# Patient Record
Sex: FEMALE | Race: WHITE | NOT HISPANIC OR LATINO | URBAN - METROPOLITAN AREA
[De-identification: names, ages, dates, MRNs, and addresses within clinical notes are randomized per-mention and may not be internally consistent; named-entity substitution may affect disease eponyms.]

---

## 2017-01-03 ENCOUNTER — COMMUNICATION - HEALTHEAST (OUTPATIENT)
Dept: PALLIATIVE MEDICINE | Facility: OTHER | Age: 57
End: 2017-01-03

## 2017-01-03 DIAGNOSIS — M51.379 DEGENERATION OF LUMBAR OR LUMBOSACRAL INTERVERTEBRAL DISC: ICD-10-CM

## 2017-01-03 DIAGNOSIS — M47.812 CERVICAL SPONDYLOSIS WITHOUT MYELOPATHY: ICD-10-CM

## 2017-01-13 ENCOUNTER — COMMUNICATION - HEALTHEAST (OUTPATIENT)
Dept: PALLIATIVE MEDICINE | Facility: OTHER | Age: 57
End: 2017-01-13

## 2017-01-13 DIAGNOSIS — M47.812 CERVICAL SPONDYLOSIS WITHOUT MYELOPATHY: ICD-10-CM

## 2017-01-13 DIAGNOSIS — M51.379 DEGENERATION OF LUMBAR OR LUMBOSACRAL INTERVERTEBRAL DISC: ICD-10-CM

## 2017-02-08 ENCOUNTER — HOSPITAL ENCOUNTER (OUTPATIENT)
Dept: PALLIATIVE MEDICINE | Facility: OTHER | Age: 57
Discharge: HOME | End: 2017-02-08
Attending: ANESTHESIOLOGY

## 2017-02-08 DIAGNOSIS — G89.4 CHRONIC PAIN SYNDROME: ICD-10-CM

## 2017-02-09 ENCOUNTER — COMMUNICATION - HEALTHEAST (OUTPATIENT)
Dept: PALLIATIVE MEDICINE | Facility: OTHER | Age: 57
End: 2017-02-09

## 2017-02-10 ENCOUNTER — HOSPITAL ENCOUNTER (OUTPATIENT)
Dept: PALLIATIVE MEDICINE | Facility: OTHER | Age: 57
Discharge: HOME | End: 2017-02-10

## 2017-02-10 DIAGNOSIS — M54.6 PAIN IN THORACIC SPINE: ICD-10-CM

## 2017-02-10 DIAGNOSIS — F11.20 OPIOID TYPE DEPENDENCE, CONTINUOUS (H): ICD-10-CM

## 2017-02-10 DIAGNOSIS — M47.812 CERVICAL SPONDYLOSIS WITHOUT MYELOPATHY: ICD-10-CM

## 2017-02-10 DIAGNOSIS — M51.379 DEGENERATION OF LUMBAR OR LUMBOSACRAL INTERVERTEBRAL DISC: ICD-10-CM

## 2017-02-10 DIAGNOSIS — G89.29 OTHER CHRONIC PAIN: ICD-10-CM

## 2017-02-10 DIAGNOSIS — N94.9 FEMALE GENITAL SYMPTOMS: ICD-10-CM

## 2017-02-10 DIAGNOSIS — G89.4 CHRONIC PAIN SYNDROME: ICD-10-CM

## 2017-02-10 DIAGNOSIS — M54.32 SCIATICA OF LEFT SIDE: ICD-10-CM

## 2017-02-10 ASSESSMENT — MIFFLIN-ST. JEOR: SCORE: 1108.39

## 2017-03-09 ENCOUNTER — COMMUNICATION - HEALTHEAST (OUTPATIENT)
Dept: PALLIATIVE MEDICINE | Facility: OTHER | Age: 57
End: 2017-03-09

## 2017-03-09 DIAGNOSIS — M47.812 CERVICAL SPONDYLOSIS WITHOUT MYELOPATHY: ICD-10-CM

## 2017-03-09 DIAGNOSIS — M51.379 DEGENERATION OF LUMBAR OR LUMBOSACRAL INTERVERTEBRAL DISC: ICD-10-CM

## 2017-04-07 ENCOUNTER — HOSPITAL ENCOUNTER (OUTPATIENT)
Dept: PALLIATIVE MEDICINE | Facility: OTHER | Age: 57
Discharge: HOME | End: 2017-04-07
Attending: ANESTHESIOLOGY

## 2017-04-07 DIAGNOSIS — M54.2 CERVICALGIA: ICD-10-CM

## 2017-04-07 DIAGNOSIS — M47.812 CERVICAL SPONDYLOSIS WITHOUT MYELOPATHY: ICD-10-CM

## 2017-04-07 DIAGNOSIS — G89.4 CHRONIC PAIN SYNDROME: ICD-10-CM

## 2017-04-07 DIAGNOSIS — M51.379 DEGENERATION OF LUMBAR OR LUMBOSACRAL INTERVERTEBRAL DISC: ICD-10-CM

## 2017-04-07 ASSESSMENT — MIFFLIN-ST. JEOR: SCORE: 1108.39

## 2017-05-01 ENCOUNTER — COMMUNICATION - HEALTHEAST (OUTPATIENT)
Dept: PALLIATIVE MEDICINE | Facility: OTHER | Age: 57
End: 2017-05-01

## 2017-05-01 DIAGNOSIS — M54.2 CERVICALGIA: ICD-10-CM

## 2017-05-01 DIAGNOSIS — M51.379 DEGENERATION OF LUMBAR OR LUMBOSACRAL INTERVERTEBRAL DISC: ICD-10-CM

## 2017-05-01 DIAGNOSIS — M47.812 CERVICAL SPONDYLOSIS WITHOUT MYELOPATHY: ICD-10-CM

## 2017-05-09 ENCOUNTER — COMMUNICATION - HEALTHEAST (OUTPATIENT)
Dept: PALLIATIVE MEDICINE | Facility: OTHER | Age: 57
End: 2017-05-09

## 2017-05-09 DIAGNOSIS — M51.379 DEGENERATION OF LUMBAR OR LUMBOSACRAL INTERVERTEBRAL DISC: ICD-10-CM

## 2017-05-09 DIAGNOSIS — M54.2 CERVICALGIA: ICD-10-CM

## 2017-05-09 DIAGNOSIS — M47.812 CERVICAL SPONDYLOSIS WITHOUT MYELOPATHY: ICD-10-CM

## 2017-05-11 ENCOUNTER — COMMUNICATION - HEALTHEAST (OUTPATIENT)
Dept: PALLIATIVE MEDICINE | Facility: CLINIC | Age: 57
End: 2017-05-11

## 2017-05-11 DIAGNOSIS — M54.2 CERVICALGIA: ICD-10-CM

## 2017-05-19 ENCOUNTER — HOSPITAL ENCOUNTER (OUTPATIENT)
Dept: PALLIATIVE MEDICINE | Facility: OTHER | Age: 57
Discharge: HOME | End: 2017-05-19
Attending: ANESTHESIOLOGY

## 2017-05-19 DIAGNOSIS — M47.812 CERVICAL SPONDYLOSIS WITHOUT MYELOPATHY: ICD-10-CM

## 2017-05-19 DIAGNOSIS — G89.29 OTHER CHRONIC PAIN: ICD-10-CM

## 2017-05-19 DIAGNOSIS — M54.2 CERVICALGIA: ICD-10-CM

## 2017-05-19 DIAGNOSIS — M51.379 DEGENERATION OF LUMBAR OR LUMBOSACRAL INTERVERTEBRAL DISC: ICD-10-CM

## 2017-05-19 ASSESSMENT — MIFFLIN-ST. JEOR: SCORE: 1108.39

## 2017-06-07 ENCOUNTER — COMMUNICATION - HEALTHEAST (OUTPATIENT)
Dept: SCHEDULING | Facility: CLINIC | Age: 57
End: 2017-06-07

## 2017-06-07 DIAGNOSIS — N93.9 VAGINAL BLEEDING: ICD-10-CM

## 2017-06-07 DIAGNOSIS — K62.5 RECTAL BLEEDING: ICD-10-CM

## 2017-06-09 ENCOUNTER — COMMUNICATION - HEALTHEAST (OUTPATIENT)
Dept: PALLIATIVE MEDICINE | Facility: CLINIC | Age: 57
End: 2017-06-09

## 2017-06-09 DIAGNOSIS — M51.379 DEGENERATION OF LUMBAR OR LUMBOSACRAL INTERVERTEBRAL DISC: ICD-10-CM

## 2017-06-09 DIAGNOSIS — M47.812 CERVICAL SPONDYLOSIS WITHOUT MYELOPATHY: ICD-10-CM

## 2017-07-07 ENCOUNTER — HOSPITAL ENCOUNTER (OUTPATIENT)
Dept: PALLIATIVE MEDICINE | Facility: OTHER | Age: 57
Discharge: HOME | End: 2017-07-07
Attending: ANESTHESIOLOGY

## 2017-07-07 DIAGNOSIS — M54.2 CERVICALGIA: ICD-10-CM

## 2017-07-07 DIAGNOSIS — M51.379 DEGENERATION OF LUMBAR OR LUMBOSACRAL INTERVERTEBRAL DISC: ICD-10-CM

## 2017-07-07 DIAGNOSIS — M47.812 CERVICAL SPONDYLOSIS WITHOUT MYELOPATHY: ICD-10-CM

## 2017-07-07 DIAGNOSIS — M54.6 PAIN IN THORACIC SPINE: ICD-10-CM

## 2017-07-07 RX ORDER — QUETIAPINE FUMARATE 25 MG/1
25 TABLET, FILM COATED ORAL AT BEDTIME
Status: SHIPPED | COMMUNITY
Start: 2017-07-07

## 2017-07-07 ASSESSMENT — MIFFLIN-ST. JEOR: SCORE: 1092.52

## 2017-07-13 ENCOUNTER — COMMUNICATION - HEALTHEAST (OUTPATIENT)
Dept: PALLIATIVE MEDICINE | Facility: OTHER | Age: 57
End: 2017-07-13

## 2017-07-13 DIAGNOSIS — M54.6 PAIN IN THORACIC SPINE: ICD-10-CM

## 2017-07-24 ENCOUNTER — OFFICE VISIT - HEALTHEAST (OUTPATIENT)
Dept: INTERNAL MEDICINE | Facility: CLINIC | Age: 57
End: 2017-07-24

## 2017-07-24 DIAGNOSIS — Z12.11 COLON CANCER SCREENING: ICD-10-CM

## 2017-07-24 DIAGNOSIS — Z01.818 PRE-OPERATIVE EXAMINATION FOR INTERNAL MEDICINE: ICD-10-CM

## 2017-07-24 DIAGNOSIS — Z12.31 VISIT FOR SCREENING MAMMOGRAM: ICD-10-CM

## 2017-07-24 ASSESSMENT — MIFFLIN-ST. JEOR: SCORE: 1093.93

## 2017-07-25 ENCOUNTER — COMMUNICATION - HEALTHEAST (OUTPATIENT)
Dept: INTERNAL MEDICINE | Facility: CLINIC | Age: 57
End: 2017-07-25

## 2017-07-25 LAB
ATRIAL RATE - MUSE: 79 BPM
DIASTOLIC BLOOD PRESSURE - MUSE: NORMAL MMHG
INTERPRETATION ECG - MUSE: NORMAL
P AXIS - MUSE: 107 DEGREES
PR INTERVAL - MUSE: 172 MS
QRS DURATION - MUSE: 72 MS
QT - MUSE: 398 MS
QTC - MUSE: 456 MS
R AXIS - MUSE: 79 DEGREES
SYSTOLIC BLOOD PRESSURE - MUSE: NORMAL MMHG
T AXIS - MUSE: 60 DEGREES
VENTRICULAR RATE- MUSE: 79 BPM

## 2017-07-26 ENCOUNTER — RECORDS - HEALTHEAST (OUTPATIENT)
Dept: MAMMOGRAPHY | Facility: CLINIC | Age: 57
End: 2017-07-26

## 2017-07-26 DIAGNOSIS — Z12.31 ENCOUNTER FOR SCREENING MAMMOGRAM FOR MALIGNANT NEOPLASM OF BREAST: ICD-10-CM

## 2017-07-29 ENCOUNTER — COMMUNICATION - HEALTHEAST (OUTPATIENT)
Dept: PALLIATIVE MEDICINE | Facility: OTHER | Age: 57
End: 2017-07-29

## 2017-07-29 DIAGNOSIS — M54.6 PAIN IN THORACIC SPINE: ICD-10-CM

## 2017-07-30 ENCOUNTER — COMMUNICATION - HEALTHEAST (OUTPATIENT)
Dept: PALLIATIVE MEDICINE | Facility: CLINIC | Age: 57
End: 2017-07-30

## 2017-07-30 DIAGNOSIS — M54.6 PAIN IN THORACIC SPINE: ICD-10-CM

## 2017-08-07 ENCOUNTER — COMMUNICATION - HEALTHEAST (OUTPATIENT)
Dept: PALLIATIVE MEDICINE | Facility: OTHER | Age: 57
End: 2017-08-07

## 2017-08-17 ENCOUNTER — COMMUNICATION - HEALTHEAST (OUTPATIENT)
Dept: PALLIATIVE MEDICINE | Facility: CLINIC | Age: 57
End: 2017-08-17

## 2017-08-17 DIAGNOSIS — M54.6 PAIN IN THORACIC SPINE: ICD-10-CM

## 2017-08-19 ENCOUNTER — COMMUNICATION - HEALTHEAST (OUTPATIENT)
Dept: PALLIATIVE MEDICINE | Facility: CLINIC | Age: 57
End: 2017-08-19

## 2017-08-19 DIAGNOSIS — M54.6 PAIN IN THORACIC SPINE: ICD-10-CM

## 2017-08-29 ENCOUNTER — COMMUNICATION - HEALTHEAST (OUTPATIENT)
Dept: PALLIATIVE MEDICINE | Facility: CLINIC | Age: 57
End: 2017-08-29

## 2017-08-29 ENCOUNTER — COMMUNICATION - HEALTHEAST (OUTPATIENT)
Dept: PALLIATIVE MEDICINE | Facility: OTHER | Age: 57
End: 2017-08-29

## 2017-08-29 DIAGNOSIS — M54.6 PAIN IN THORACIC SPINE: ICD-10-CM

## 2017-08-29 RX ORDER — BUPROPION HYDROCHLORIDE 150 MG/1
450 TABLET ORAL DAILY
Status: SHIPPED | COMMUNITY
Start: 2016-03-07

## 2017-08-31 ENCOUNTER — HOSPITAL ENCOUNTER (OUTPATIENT)
Dept: PALLIATIVE MEDICINE | Facility: OTHER | Age: 57
Discharge: HOME | End: 2017-08-31
Attending: ANESTHESIOLOGY

## 2017-08-31 DIAGNOSIS — M54.2 CERVICALGIA: ICD-10-CM

## 2017-08-31 ASSESSMENT — MIFFLIN-ST. JEOR: SCORE: 1092.52

## 2017-09-06 ENCOUNTER — COMMUNICATION - HEALTHEAST (OUTPATIENT)
Dept: PALLIATIVE MEDICINE | Facility: OTHER | Age: 57
End: 2017-09-06

## 2017-09-06 DIAGNOSIS — M54.6 PAIN IN THORACIC SPINE: ICD-10-CM

## 2017-09-11 ENCOUNTER — COMMUNICATION - HEALTHEAST (OUTPATIENT)
Dept: PALLIATIVE MEDICINE | Facility: OTHER | Age: 57
End: 2017-09-11

## 2017-09-11 DIAGNOSIS — M54.6 PAIN IN THORACIC SPINE: ICD-10-CM

## 2017-09-14 ENCOUNTER — COMMUNICATION - HEALTHEAST (OUTPATIENT)
Dept: PALLIATIVE MEDICINE | Facility: OTHER | Age: 57
End: 2017-09-14

## 2017-09-14 DIAGNOSIS — G89.29 OTHER CHRONIC PAIN: ICD-10-CM

## 2017-09-19 ENCOUNTER — RECORDS - HEALTHEAST (OUTPATIENT)
Dept: ADMINISTRATIVE | Facility: OTHER | Age: 57
End: 2017-09-19

## 2017-09-28 ENCOUNTER — COMMUNICATION - HEALTHEAST (OUTPATIENT)
Dept: PALLIATIVE MEDICINE | Facility: CLINIC | Age: 57
End: 2017-09-28

## 2017-09-28 DIAGNOSIS — M54.2 CERVICALGIA: ICD-10-CM

## 2017-10-02 ENCOUNTER — COMMUNICATION - HEALTHEAST (OUTPATIENT)
Dept: INTERNAL MEDICINE | Facility: CLINIC | Age: 57
End: 2017-10-02

## 2017-10-03 ENCOUNTER — OFFICE VISIT - HEALTHEAST (OUTPATIENT)
Dept: INTERNAL MEDICINE | Facility: CLINIC | Age: 57
End: 2017-10-03

## 2017-10-03 DIAGNOSIS — J01.90 ACUTE SINUSITIS: ICD-10-CM

## 2017-10-03 DIAGNOSIS — J20.9 ACUTE BRONCHITIS: ICD-10-CM

## 2017-10-20 ENCOUNTER — HOSPITAL ENCOUNTER (OUTPATIENT)
Dept: PALLIATIVE MEDICINE | Facility: OTHER | Age: 57
Discharge: HOME | End: 2017-10-20
Attending: ANESTHESIOLOGY

## 2017-10-20 DIAGNOSIS — G89.4 CHRONIC PAIN SYNDROME: ICD-10-CM

## 2017-10-25 ENCOUNTER — COMMUNICATION - HEALTHEAST (OUTPATIENT)
Dept: INTERNAL MEDICINE | Facility: CLINIC | Age: 57
End: 2017-10-25

## 2017-11-01 ENCOUNTER — COMMUNICATION - HEALTHEAST (OUTPATIENT)
Dept: PALLIATIVE MEDICINE | Facility: OTHER | Age: 57
End: 2017-11-01

## 2017-11-01 ENCOUNTER — HOSPITAL ENCOUNTER (OUTPATIENT)
Dept: PALLIATIVE MEDICINE | Facility: OTHER | Age: 57
Discharge: HOME | End: 2017-11-01
Attending: ANESTHESIOLOGY

## 2017-11-01 DIAGNOSIS — G89.4 CHRONIC PAIN SYNDROME: ICD-10-CM

## 2017-11-01 DIAGNOSIS — R52 PAIN: ICD-10-CM

## 2017-11-01 DIAGNOSIS — M54.2 CERVICALGIA: ICD-10-CM

## 2017-11-01 ASSESSMENT — MIFFLIN-ST. JEOR: SCORE: 1121.99

## 2017-11-20 ENCOUNTER — COMMUNICATION - HEALTHEAST (OUTPATIENT)
Dept: PALLIATIVE MEDICINE | Facility: OTHER | Age: 57
End: 2017-11-20

## 2017-12-18 ENCOUNTER — COMMUNICATION - HEALTHEAST (OUTPATIENT)
Dept: PALLIATIVE MEDICINE | Facility: OTHER | Age: 57
End: 2017-12-18

## 2017-12-18 DIAGNOSIS — R52 PAIN: ICD-10-CM

## 2017-12-21 ENCOUNTER — COMMUNICATION - HEALTHEAST (OUTPATIENT)
Dept: PALLIATIVE MEDICINE | Facility: OTHER | Age: 57
End: 2017-12-21

## 2017-12-21 DIAGNOSIS — M54.2 CERVICALGIA: ICD-10-CM

## 2018-01-11 ENCOUNTER — COMMUNICATION - HEALTHEAST (OUTPATIENT)
Dept: PALLIATIVE MEDICINE | Facility: OTHER | Age: 58
End: 2018-01-11

## 2018-01-11 DIAGNOSIS — F32.A DEPRESSION: ICD-10-CM

## 2018-01-12 ENCOUNTER — HOSPITAL ENCOUNTER (OUTPATIENT)
Dept: PALLIATIVE MEDICINE | Facility: OTHER | Age: 58
Discharge: HOME | End: 2018-01-12
Attending: ANESTHESIOLOGY

## 2018-01-12 DIAGNOSIS — M54.2 CERVICALGIA: ICD-10-CM

## 2018-01-12 DIAGNOSIS — G89.4 CHRONIC PAIN SYNDROME: ICD-10-CM

## 2018-01-12 ASSESSMENT — MIFFLIN-ST. JEOR: SCORE: 1131.07

## 2018-01-15 ENCOUNTER — COMMUNICATION - HEALTHEAST (OUTPATIENT)
Dept: PALLIATIVE MEDICINE | Facility: OTHER | Age: 58
End: 2018-01-15

## 2018-01-18 ENCOUNTER — COMMUNICATION - HEALTHEAST (OUTPATIENT)
Dept: PALLIATIVE MEDICINE | Facility: OTHER | Age: 58
End: 2018-01-18

## 2018-01-18 DIAGNOSIS — M54.2 CERVICALGIA: ICD-10-CM

## 2018-01-31 ENCOUNTER — COMMUNICATION - HEALTHEAST (OUTPATIENT)
Dept: PALLIATIVE MEDICINE | Facility: OTHER | Age: 58
End: 2018-01-31

## 2018-02-20 ENCOUNTER — COMMUNICATION - HEALTHEAST (OUTPATIENT)
Dept: PALLIATIVE MEDICINE | Facility: OTHER | Age: 58
End: 2018-02-20

## 2018-02-20 DIAGNOSIS — M54.2 CERVICALGIA: ICD-10-CM

## 2018-02-21 ENCOUNTER — RECORDS - HEALTHEAST (OUTPATIENT)
Dept: ADMINISTRATIVE | Facility: OTHER | Age: 58
End: 2018-02-21

## 2018-02-21 ENCOUNTER — COMMUNICATION - HEALTHEAST (OUTPATIENT)
Dept: PALLIATIVE MEDICINE | Facility: OTHER | Age: 58
End: 2018-02-21

## 2018-02-21 DIAGNOSIS — F32.A DEPRESSION: ICD-10-CM

## 2018-03-07 ENCOUNTER — HOSPITAL ENCOUNTER (OUTPATIENT)
Dept: PALLIATIVE MEDICINE | Facility: OTHER | Age: 58
Discharge: HOME | End: 2018-03-07
Attending: ANESTHESIOLOGY

## 2018-03-07 DIAGNOSIS — M54.2 CERVICALGIA: ICD-10-CM

## 2018-03-07 DIAGNOSIS — G89.29 OTHER CHRONIC PAIN: ICD-10-CM

## 2018-03-07 ASSESSMENT — MIFFLIN-ST. JEOR: SCORE: 1131.07

## 2018-03-08 ENCOUNTER — COMMUNICATION - HEALTHEAST (OUTPATIENT)
Dept: PALLIATIVE MEDICINE | Facility: OTHER | Age: 58
End: 2018-03-08

## 2018-03-08 DIAGNOSIS — G89.29 OTHER CHRONIC PAIN: ICD-10-CM

## 2018-03-19 ENCOUNTER — COMMUNICATION - HEALTHEAST (OUTPATIENT)
Dept: PALLIATIVE MEDICINE | Facility: OTHER | Age: 58
End: 2018-03-19

## 2018-03-26 ENCOUNTER — COMMUNICATION - HEALTHEAST (OUTPATIENT)
Dept: PALLIATIVE MEDICINE | Facility: OTHER | Age: 58
End: 2018-03-26

## 2018-03-26 DIAGNOSIS — R52 PAIN: ICD-10-CM

## 2018-04-05 ENCOUNTER — COMMUNICATION - HEALTHEAST (OUTPATIENT)
Dept: PALLIATIVE MEDICINE | Facility: OTHER | Age: 58
End: 2018-04-05

## 2018-04-05 DIAGNOSIS — R52 PAIN: ICD-10-CM

## 2018-04-05 DIAGNOSIS — M54.2 CERVICALGIA: ICD-10-CM

## 2018-04-06 ENCOUNTER — COMMUNICATION - HEALTHEAST (OUTPATIENT)
Dept: PALLIATIVE MEDICINE | Facility: OTHER | Age: 58
End: 2018-04-06

## 2018-04-11 ENCOUNTER — COMMUNICATION - HEALTHEAST (OUTPATIENT)
Dept: PALLIATIVE MEDICINE | Facility: OTHER | Age: 58
End: 2018-04-11

## 2018-04-11 DIAGNOSIS — R52 PAIN: ICD-10-CM

## 2018-04-26 ENCOUNTER — COMMUNICATION - HEALTHEAST (OUTPATIENT)
Dept: PALLIATIVE MEDICINE | Facility: CLINIC | Age: 58
End: 2018-04-26

## 2018-04-26 DIAGNOSIS — R52 PAIN: ICD-10-CM

## 2018-04-30 ENCOUNTER — COMMUNICATION - HEALTHEAST (OUTPATIENT)
Dept: PALLIATIVE MEDICINE | Facility: CLINIC | Age: 58
End: 2018-04-30

## 2018-04-30 DIAGNOSIS — R52 PAIN: ICD-10-CM

## 2018-05-08 ENCOUNTER — HOSPITAL ENCOUNTER (OUTPATIENT)
Dept: PALLIATIVE MEDICINE | Facility: OTHER | Age: 58
Discharge: HOME | End: 2018-05-08
Attending: ANESTHESIOLOGY

## 2018-05-08 DIAGNOSIS — R52 PAIN: ICD-10-CM

## 2018-05-08 DIAGNOSIS — M54.2 CERVICALGIA: ICD-10-CM

## 2018-05-08 DIAGNOSIS — F11.20 OPIOID TYPE DEPENDENCE, CONTINUOUS (H): ICD-10-CM

## 2018-05-08 ASSESSMENT — MIFFLIN-ST. JEOR: SCORE: 1131.07

## 2018-05-25 ENCOUNTER — COMMUNICATION - HEALTHEAST (OUTPATIENT)
Dept: PALLIATIVE MEDICINE | Facility: OTHER | Age: 58
End: 2018-05-25

## 2018-05-25 DIAGNOSIS — R52 PAIN: ICD-10-CM

## 2018-06-04 ENCOUNTER — COMMUNICATION - HEALTHEAST (OUTPATIENT)
Dept: PALLIATIVE MEDICINE | Facility: OTHER | Age: 58
End: 2018-06-04

## 2018-06-04 DIAGNOSIS — M54.2 CERVICALGIA: ICD-10-CM

## 2018-06-20 ENCOUNTER — HOSPITAL ENCOUNTER (OUTPATIENT)
Dept: PALLIATIVE MEDICINE | Facility: OTHER | Age: 58
Discharge: HOME | End: 2018-06-20
Attending: ANESTHESIOLOGY

## 2018-06-20 DIAGNOSIS — R52 PAIN: ICD-10-CM

## 2018-06-20 DIAGNOSIS — M54.2 CERVICALGIA: ICD-10-CM

## 2018-06-20 ASSESSMENT — MIFFLIN-ST. JEOR: SCORE: 1131.07

## 2018-07-07 ENCOUNTER — COMMUNICATION - HEALTHEAST (OUTPATIENT)
Dept: PALLIATIVE MEDICINE | Facility: OTHER | Age: 58
End: 2018-07-07

## 2018-07-07 DIAGNOSIS — M54.2 CERVICALGIA: ICD-10-CM

## 2018-07-13 ENCOUNTER — COMMUNICATION - HEALTHEAST (OUTPATIENT)
Dept: PALLIATIVE MEDICINE | Facility: OTHER | Age: 58
End: 2018-07-13

## 2018-07-13 DIAGNOSIS — R52 PAIN: ICD-10-CM

## 2018-08-01 ENCOUNTER — COMMUNICATION - HEALTHEAST (OUTPATIENT)
Dept: PALLIATIVE MEDICINE | Facility: OTHER | Age: 58
End: 2018-08-01

## 2018-08-01 DIAGNOSIS — R52 PAIN: ICD-10-CM

## 2018-08-07 ENCOUNTER — RECORDS - HEALTHEAST (OUTPATIENT)
Dept: ADMINISTRATIVE | Facility: OTHER | Age: 58
End: 2018-08-07

## 2018-08-07 ENCOUNTER — COMMUNICATION - HEALTHEAST (OUTPATIENT)
Dept: PALLIATIVE MEDICINE | Facility: CLINIC | Age: 58
End: 2018-08-07

## 2018-08-07 DIAGNOSIS — M54.2 CERVICALGIA: ICD-10-CM

## 2018-08-08 ENCOUNTER — COMMUNICATION - HEALTHEAST (OUTPATIENT)
Dept: PALLIATIVE MEDICINE | Facility: OTHER | Age: 58
End: 2018-08-08

## 2018-08-08 DIAGNOSIS — F32.A DEPRESSION: ICD-10-CM

## 2018-08-17 ENCOUNTER — COMMUNICATION - HEALTHEAST (OUTPATIENT)
Dept: INTERNAL MEDICINE | Facility: CLINIC | Age: 58
End: 2018-08-17

## 2018-08-21 ENCOUNTER — COMMUNICATION - HEALTHEAST (OUTPATIENT)
Dept: PALLIATIVE MEDICINE | Facility: OTHER | Age: 58
End: 2018-08-21

## 2018-08-21 DIAGNOSIS — R52 PAIN: ICD-10-CM

## 2018-09-04 ENCOUNTER — COMMUNICATION - HEALTHEAST (OUTPATIENT)
Dept: PALLIATIVE MEDICINE | Facility: OTHER | Age: 58
End: 2018-09-04

## 2018-09-04 ENCOUNTER — COMMUNICATION - HEALTHEAST (OUTPATIENT)
Dept: PALLIATIVE MEDICINE | Facility: CLINIC | Age: 58
End: 2018-09-04

## 2018-09-04 DIAGNOSIS — K59.01 SLOW TRANSIT CONSTIPATION: ICD-10-CM

## 2018-09-04 DIAGNOSIS — R52 PAIN: ICD-10-CM

## 2018-09-04 DIAGNOSIS — M54.2 CERVICALGIA: ICD-10-CM

## 2018-09-10 ENCOUNTER — COMMUNICATION - HEALTHEAST (OUTPATIENT)
Dept: PALLIATIVE MEDICINE | Facility: OTHER | Age: 58
End: 2018-09-10

## 2018-09-12 ENCOUNTER — COMMUNICATION - HEALTHEAST (OUTPATIENT)
Dept: PALLIATIVE MEDICINE | Facility: OTHER | Age: 58
End: 2018-09-12

## 2018-09-20 ENCOUNTER — COMMUNICATION - HEALTHEAST (OUTPATIENT)
Dept: PALLIATIVE MEDICINE | Facility: CLINIC | Age: 58
End: 2018-09-20

## 2018-09-20 DIAGNOSIS — R52 PAIN: ICD-10-CM

## 2018-10-04 ENCOUNTER — COMMUNICATION - HEALTHEAST (OUTPATIENT)
Dept: PALLIATIVE MEDICINE | Facility: OTHER | Age: 58
End: 2018-10-04

## 2018-10-04 DIAGNOSIS — R52 PAIN: ICD-10-CM

## 2018-10-04 DIAGNOSIS — M54.2 CERVICALGIA: ICD-10-CM

## 2018-10-05 ENCOUNTER — COMMUNICATION - HEALTHEAST (OUTPATIENT)
Dept: PALLIATIVE MEDICINE | Facility: OTHER | Age: 58
End: 2018-10-05

## 2018-10-15 ENCOUNTER — RECORDS - HEALTHEAST (OUTPATIENT)
Dept: ADMINISTRATIVE | Facility: OTHER | Age: 58
End: 2018-10-15

## 2018-10-15 ENCOUNTER — COMMUNICATION - HEALTHEAST (OUTPATIENT)
Dept: PALLIATIVE MEDICINE | Facility: OTHER | Age: 58
End: 2018-10-15

## 2018-10-15 ENCOUNTER — HOSPITAL ENCOUNTER (OUTPATIENT)
Dept: PALLIATIVE MEDICINE | Facility: OTHER | Age: 58
Discharge: HOME | End: 2018-10-15
Attending: ANESTHESIOLOGY

## 2018-10-15 DIAGNOSIS — M54.2 CERVICALGIA: ICD-10-CM

## 2018-10-15 DIAGNOSIS — K59.01 SLOW TRANSIT CONSTIPATION: ICD-10-CM

## 2018-10-15 DIAGNOSIS — R52 PAIN: ICD-10-CM

## 2018-10-15 DIAGNOSIS — G89.29 OTHER CHRONIC PAIN: ICD-10-CM

## 2018-10-15 ASSESSMENT — MIFFLIN-ST. JEOR: SCORE: 1131.07

## 2018-10-17 ENCOUNTER — COMMUNICATION - HEALTHEAST (OUTPATIENT)
Dept: PALLIATIVE MEDICINE | Facility: CLINIC | Age: 58
End: 2018-10-17

## 2018-10-17 DIAGNOSIS — R10.13 ABDOMINAL PAIN, EPIGASTRIC: ICD-10-CM

## 2018-10-26 ENCOUNTER — COMMUNICATION - HEALTHEAST (OUTPATIENT)
Dept: PALLIATIVE MEDICINE | Facility: OTHER | Age: 58
End: 2018-10-26

## 2018-10-26 DIAGNOSIS — R52 PAIN: ICD-10-CM

## 2018-11-06 ENCOUNTER — COMMUNICATION - HEALTHEAST (OUTPATIENT)
Dept: PALLIATIVE MEDICINE | Facility: OTHER | Age: 58
End: 2018-11-06

## 2018-11-06 DIAGNOSIS — R52 PAIN: ICD-10-CM

## 2018-11-06 DIAGNOSIS — R10.13 ABDOMINAL PAIN, EPIGASTRIC: ICD-10-CM

## 2018-11-07 ENCOUNTER — COMMUNICATION - HEALTHEAST (OUTPATIENT)
Dept: PALLIATIVE MEDICINE | Facility: OTHER | Age: 58
End: 2018-11-07

## 2018-11-14 ENCOUNTER — COMMUNICATION - HEALTHEAST (OUTPATIENT)
Dept: PALLIATIVE MEDICINE | Facility: CLINIC | Age: 58
End: 2018-11-14

## 2018-11-14 DIAGNOSIS — M54.2 CERVICALGIA: ICD-10-CM

## 2018-11-15 ENCOUNTER — COMMUNICATION - HEALTHEAST (OUTPATIENT)
Dept: PALLIATIVE MEDICINE | Facility: OTHER | Age: 58
End: 2018-11-15

## 2018-11-15 ENCOUNTER — OFFICE VISIT - HEALTHEAST (OUTPATIENT)
Dept: PHYSICAL THERAPY | Facility: REHABILITATION | Age: 58
End: 2018-11-15

## 2018-11-15 DIAGNOSIS — F41.1 GENERALIZED ANXIETY DISORDER: ICD-10-CM

## 2018-11-15 DIAGNOSIS — N94.9 FEMALE GENITAL SYMPTOMS: ICD-10-CM

## 2018-11-15 DIAGNOSIS — M54.32 BILATERAL SCIATICA: ICD-10-CM

## 2018-11-15 DIAGNOSIS — Z98.890 HISTORY OF PELVIC SURGERY: ICD-10-CM

## 2018-11-15 DIAGNOSIS — M47.817 LUMBOSACRAL SPONDYLOSIS WITHOUT MYELOPATHY: ICD-10-CM

## 2018-11-15 DIAGNOSIS — M54.31 BILATERAL SCIATICA: ICD-10-CM

## 2018-11-15 DIAGNOSIS — F30.9 BIPOLAR I DISORDER, SINGLE MANIC EPISODE (H): ICD-10-CM

## 2018-11-15 DIAGNOSIS — G89.29 OTHER CHRONIC PAIN: ICD-10-CM

## 2018-11-15 DIAGNOSIS — R10.13 ABDOMINAL PAIN, EPIGASTRIC: ICD-10-CM

## 2018-11-21 ENCOUNTER — COMMUNICATION - HEALTHEAST (OUTPATIENT)
Dept: PALLIATIVE MEDICINE | Facility: CLINIC | Age: 58
End: 2018-11-21

## 2018-11-21 DIAGNOSIS — R10.13 ABDOMINAL PAIN, EPIGASTRIC: ICD-10-CM

## 2018-11-26 ENCOUNTER — COMMUNICATION - HEALTHEAST (OUTPATIENT)
Dept: PALLIATIVE MEDICINE | Facility: OTHER | Age: 58
End: 2018-11-26

## 2018-11-26 ENCOUNTER — COMMUNICATION - HEALTHEAST (OUTPATIENT)
Dept: PHYSICAL THERAPY | Facility: REHABILITATION | Age: 58
End: 2018-11-26

## 2018-11-26 DIAGNOSIS — R10.13 ABDOMINAL PAIN, EPIGASTRIC: ICD-10-CM

## 2018-11-29 ENCOUNTER — OFFICE VISIT - HEALTHEAST (OUTPATIENT)
Dept: PHYSICAL THERAPY | Facility: REHABILITATION | Age: 58
End: 2018-11-29

## 2018-11-29 DIAGNOSIS — F41.1 GENERALIZED ANXIETY DISORDER: ICD-10-CM

## 2018-11-29 DIAGNOSIS — F30.9 BIPOLAR I DISORDER, SINGLE MANIC EPISODE (H): ICD-10-CM

## 2018-11-29 DIAGNOSIS — G89.29 OTHER CHRONIC PAIN: ICD-10-CM

## 2018-11-29 DIAGNOSIS — N94.9 FEMALE GENITAL SYMPTOMS: ICD-10-CM

## 2018-11-29 DIAGNOSIS — M47.817 LUMBOSACRAL SPONDYLOSIS WITHOUT MYELOPATHY: ICD-10-CM

## 2018-11-29 DIAGNOSIS — M54.31 BILATERAL SCIATICA: ICD-10-CM

## 2018-11-29 DIAGNOSIS — M54.32 BILATERAL SCIATICA: ICD-10-CM

## 2018-11-29 DIAGNOSIS — Z98.890 HISTORY OF PELVIC SURGERY: ICD-10-CM

## 2018-11-30 ENCOUNTER — COMMUNICATION - HEALTHEAST (OUTPATIENT)
Dept: PALLIATIVE MEDICINE | Facility: OTHER | Age: 58
End: 2018-11-30

## 2018-11-30 DIAGNOSIS — R10.13 ABDOMINAL PAIN, EPIGASTRIC: ICD-10-CM

## 2018-12-04 ENCOUNTER — OFFICE VISIT - HEALTHEAST (OUTPATIENT)
Dept: PHYSICAL THERAPY | Facility: REHABILITATION | Age: 58
End: 2018-12-04

## 2018-12-04 ENCOUNTER — COMMUNICATION - HEALTHEAST (OUTPATIENT)
Dept: PALLIATIVE MEDICINE | Facility: CLINIC | Age: 58
End: 2018-12-04

## 2018-12-04 DIAGNOSIS — Z98.890 HISTORY OF PELVIC SURGERY: ICD-10-CM

## 2018-12-04 DIAGNOSIS — R52 PAIN: ICD-10-CM

## 2018-12-04 DIAGNOSIS — F41.1 GENERALIZED ANXIETY DISORDER: ICD-10-CM

## 2018-12-04 DIAGNOSIS — N94.9 FEMALE GENITAL SYMPTOMS: ICD-10-CM

## 2018-12-04 DIAGNOSIS — G89.29 OTHER CHRONIC PAIN: ICD-10-CM

## 2018-12-04 DIAGNOSIS — M47.817 LUMBOSACRAL SPONDYLOSIS WITHOUT MYELOPATHY: ICD-10-CM

## 2018-12-04 DIAGNOSIS — M54.32 BILATERAL SCIATICA: ICD-10-CM

## 2018-12-04 DIAGNOSIS — M54.31 BILATERAL SCIATICA: ICD-10-CM

## 2018-12-04 DIAGNOSIS — F30.9 BIPOLAR I DISORDER, SINGLE MANIC EPISODE (H): ICD-10-CM

## 2018-12-07 ENCOUNTER — COMMUNICATION - HEALTHEAST (OUTPATIENT)
Dept: PHYSICAL THERAPY | Facility: REHABILITATION | Age: 58
End: 2018-12-07

## 2018-12-10 ENCOUNTER — OFFICE VISIT - HEALTHEAST (OUTPATIENT)
Dept: PHYSICAL THERAPY | Facility: REHABILITATION | Age: 58
End: 2018-12-10

## 2018-12-10 ENCOUNTER — HOSPITAL ENCOUNTER (OUTPATIENT)
Dept: PALLIATIVE MEDICINE | Facility: OTHER | Age: 58
Discharge: HOME | End: 2018-12-10
Attending: ANESTHESIOLOGY

## 2018-12-10 DIAGNOSIS — R10.13 ABDOMINAL PAIN, EPIGASTRIC: ICD-10-CM

## 2018-12-10 DIAGNOSIS — Z98.890 HISTORY OF PELVIC SURGERY: ICD-10-CM

## 2018-12-10 DIAGNOSIS — R52 PAIN: ICD-10-CM

## 2018-12-10 DIAGNOSIS — G89.29 OTHER CHRONIC PAIN: ICD-10-CM

## 2018-12-10 DIAGNOSIS — M54.31 BILATERAL SCIATICA: ICD-10-CM

## 2018-12-10 DIAGNOSIS — F30.9 BIPOLAR I DISORDER, SINGLE MANIC EPISODE (H): ICD-10-CM

## 2018-12-10 DIAGNOSIS — F41.1 GENERALIZED ANXIETY DISORDER: ICD-10-CM

## 2018-12-10 DIAGNOSIS — M54.2 CERVICALGIA: ICD-10-CM

## 2018-12-10 DIAGNOSIS — M54.32 BILATERAL SCIATICA: ICD-10-CM

## 2018-12-10 DIAGNOSIS — N94.9 FEMALE GENITAL SYMPTOMS: ICD-10-CM

## 2018-12-10 DIAGNOSIS — M47.817 LUMBOSACRAL SPONDYLOSIS WITHOUT MYELOPATHY: ICD-10-CM

## 2018-12-10 ASSESSMENT — MIFFLIN-ST. JEOR: SCORE: 1131.07

## 2018-12-14 ENCOUNTER — OFFICE VISIT - HEALTHEAST (OUTPATIENT)
Dept: PHYSICAL THERAPY | Facility: REHABILITATION | Age: 58
End: 2018-12-14

## 2018-12-14 ENCOUNTER — COMMUNICATION - HEALTHEAST (OUTPATIENT)
Dept: PALLIATIVE MEDICINE | Facility: OTHER | Age: 58
End: 2018-12-14

## 2018-12-14 DIAGNOSIS — F30.9 BIPOLAR I DISORDER, SINGLE MANIC EPISODE (H): ICD-10-CM

## 2018-12-14 DIAGNOSIS — M54.32 BILATERAL SCIATICA: ICD-10-CM

## 2018-12-14 DIAGNOSIS — Z98.890 HISTORY OF PELVIC SURGERY: ICD-10-CM

## 2018-12-14 DIAGNOSIS — M54.31 BILATERAL SCIATICA: ICD-10-CM

## 2018-12-14 DIAGNOSIS — N94.9 FEMALE GENITAL SYMPTOMS: ICD-10-CM

## 2018-12-14 DIAGNOSIS — K59.01 SLOW TRANSIT CONSTIPATION: ICD-10-CM

## 2018-12-14 DIAGNOSIS — M47.817 LUMBOSACRAL SPONDYLOSIS WITHOUT MYELOPATHY: ICD-10-CM

## 2018-12-14 DIAGNOSIS — F41.1 GENERALIZED ANXIETY DISORDER: ICD-10-CM

## 2018-12-14 DIAGNOSIS — G89.29 OTHER CHRONIC PAIN: ICD-10-CM

## 2018-12-17 ENCOUNTER — OFFICE VISIT - HEALTHEAST (OUTPATIENT)
Dept: PHYSICAL THERAPY | Facility: REHABILITATION | Age: 58
End: 2018-12-17

## 2018-12-17 DIAGNOSIS — M47.817 LUMBOSACRAL SPONDYLOSIS WITHOUT MYELOPATHY: ICD-10-CM

## 2018-12-17 DIAGNOSIS — F30.9 BIPOLAR I DISORDER, SINGLE MANIC EPISODE (H): ICD-10-CM

## 2018-12-17 DIAGNOSIS — G89.29 OTHER CHRONIC PAIN: ICD-10-CM

## 2018-12-17 DIAGNOSIS — F41.1 GENERALIZED ANXIETY DISORDER: ICD-10-CM

## 2018-12-17 DIAGNOSIS — Z98.890 HISTORY OF PELVIC SURGERY: ICD-10-CM

## 2018-12-17 DIAGNOSIS — M54.31 BILATERAL SCIATICA: ICD-10-CM

## 2018-12-17 DIAGNOSIS — N94.9 FEMALE GENITAL SYMPTOMS: ICD-10-CM

## 2018-12-17 DIAGNOSIS — M54.32 BILATERAL SCIATICA: ICD-10-CM

## 2018-12-20 ENCOUNTER — COMMUNICATION - HEALTHEAST (OUTPATIENT)
Dept: PALLIATIVE MEDICINE | Facility: OTHER | Age: 58
End: 2018-12-20

## 2018-12-27 ENCOUNTER — OFFICE VISIT - HEALTHEAST (OUTPATIENT)
Dept: PHYSICAL THERAPY | Facility: REHABILITATION | Age: 58
End: 2018-12-27

## 2018-12-27 DIAGNOSIS — M47.817 LUMBOSACRAL SPONDYLOSIS WITHOUT MYELOPATHY: ICD-10-CM

## 2018-12-27 DIAGNOSIS — G89.29 OTHER CHRONIC PAIN: ICD-10-CM

## 2018-12-27 DIAGNOSIS — F41.1 GENERALIZED ANXIETY DISORDER: ICD-10-CM

## 2018-12-27 DIAGNOSIS — M54.31 BILATERAL SCIATICA: ICD-10-CM

## 2018-12-27 DIAGNOSIS — Z98.890 HISTORY OF PELVIC SURGERY: ICD-10-CM

## 2018-12-27 DIAGNOSIS — F30.9 BIPOLAR I DISORDER, SINGLE MANIC EPISODE (H): ICD-10-CM

## 2018-12-27 DIAGNOSIS — M54.32 BILATERAL SCIATICA: ICD-10-CM

## 2018-12-27 DIAGNOSIS — N94.9 FEMALE GENITAL SYMPTOMS: ICD-10-CM

## 2019-01-03 ENCOUNTER — COMMUNICATION - HEALTHEAST (OUTPATIENT)
Dept: PALLIATIVE MEDICINE | Facility: OTHER | Age: 59
End: 2019-01-03

## 2019-01-03 DIAGNOSIS — R10.13 ABDOMINAL PAIN, EPIGASTRIC: ICD-10-CM

## 2019-01-03 DIAGNOSIS — R52 PAIN: ICD-10-CM

## 2019-01-08 ENCOUNTER — COMMUNICATION - HEALTHEAST (OUTPATIENT)
Dept: PHYSICAL THERAPY | Facility: REHABILITATION | Age: 59
End: 2019-01-08

## 2019-01-21 ENCOUNTER — AMBULATORY - HEALTHEAST (OUTPATIENT)
Dept: LAB | Facility: CLINIC | Age: 59
End: 2019-01-21

## 2019-01-21 ENCOUNTER — COMMUNICATION - HEALTHEAST (OUTPATIENT)
Dept: PALLIATIVE MEDICINE | Facility: OTHER | Age: 59
End: 2019-01-21

## 2019-01-21 DIAGNOSIS — R39.11 URINARY HESITANCY: ICD-10-CM

## 2019-01-21 DIAGNOSIS — R52 PAIN: ICD-10-CM

## 2019-01-22 LAB — BACTERIA SPEC CULT: NO GROWTH

## 2019-02-01 ENCOUNTER — COMMUNICATION - HEALTHEAST (OUTPATIENT)
Dept: INTERNAL MEDICINE | Facility: CLINIC | Age: 59
End: 2019-02-01

## 2019-02-01 DIAGNOSIS — Z00.00 ROUTINE HEALTH MAINTENANCE: ICD-10-CM

## 2019-02-04 ENCOUNTER — HOSPITAL ENCOUNTER (OUTPATIENT)
Dept: PALLIATIVE MEDICINE | Facility: OTHER | Age: 59
Discharge: HOME | End: 2019-02-04
Attending: ANESTHESIOLOGY

## 2019-02-04 DIAGNOSIS — G89.4 CHRONIC PAIN SYNDROME: ICD-10-CM

## 2019-02-04 DIAGNOSIS — M79.18 MYALGIA, OTHER SITE: ICD-10-CM

## 2019-02-04 DIAGNOSIS — F11.20 OPIOID TYPE DEPENDENCE, CONTINUOUS (H): ICD-10-CM

## 2019-02-04 DIAGNOSIS — R52 PAIN: ICD-10-CM

## 2019-02-04 ASSESSMENT — MIFFLIN-ST. JEOR: SCORE: 1131.07

## 2019-02-06 ENCOUNTER — COMMUNICATION - HEALTHEAST (OUTPATIENT)
Dept: PALLIATIVE MEDICINE | Facility: OTHER | Age: 59
End: 2019-02-06

## 2019-02-06 DIAGNOSIS — R10.13 ABDOMINAL PAIN, EPIGASTRIC: ICD-10-CM

## 2019-02-06 DIAGNOSIS — M54.2 CERVICALGIA: ICD-10-CM

## 2019-02-20 ENCOUNTER — COMMUNICATION - HEALTHEAST (OUTPATIENT)
Dept: PALLIATIVE MEDICINE | Facility: OTHER | Age: 59
End: 2019-02-20

## 2019-02-20 DIAGNOSIS — R52 PAIN: ICD-10-CM

## 2019-02-20 DIAGNOSIS — M54.2 CERVICALGIA: ICD-10-CM

## 2019-03-08 ENCOUNTER — COMMUNICATION - HEALTHEAST (OUTPATIENT)
Dept: PALLIATIVE MEDICINE | Facility: OTHER | Age: 59
End: 2019-03-08

## 2019-03-08 DIAGNOSIS — G89.29 OTHER CHRONIC PAIN: ICD-10-CM

## 2019-03-11 ENCOUNTER — COMMUNICATION - HEALTHEAST (OUTPATIENT)
Dept: PALLIATIVE MEDICINE | Facility: OTHER | Age: 59
End: 2019-03-11

## 2019-03-11 DIAGNOSIS — G89.29 OTHER CHRONIC PAIN: ICD-10-CM

## 2019-03-15 ENCOUNTER — COMMUNICATION - HEALTHEAST (OUTPATIENT)
Dept: PALLIATIVE MEDICINE | Facility: OTHER | Age: 59
End: 2019-03-15

## 2019-03-15 DIAGNOSIS — R52 PAIN: ICD-10-CM

## 2019-03-27 ENCOUNTER — RECORDS - HEALTHEAST (OUTPATIENT)
Dept: ADMINISTRATIVE | Facility: OTHER | Age: 59
End: 2019-03-27

## 2019-04-01 ENCOUNTER — COMMUNICATION - HEALTHEAST (OUTPATIENT)
Dept: PALLIATIVE MEDICINE | Facility: OTHER | Age: 59
End: 2019-04-01

## 2019-04-01 DIAGNOSIS — G89.29 OTHER CHRONIC PAIN: ICD-10-CM

## 2019-04-02 ENCOUNTER — HOSPITAL ENCOUNTER (OUTPATIENT)
Dept: PALLIATIVE MEDICINE | Facility: OTHER | Age: 59
Discharge: HOME | End: 2019-04-02
Attending: ANESTHESIOLOGY

## 2019-04-02 DIAGNOSIS — G89.29 OTHER CHRONIC PAIN: ICD-10-CM

## 2019-04-02 DIAGNOSIS — R52 PAIN: ICD-10-CM

## 2019-04-02 DIAGNOSIS — G89.4 CHRONIC PAIN SYNDROME: ICD-10-CM

## 2019-04-02 ASSESSMENT — MIFFLIN-ST. JEOR: SCORE: 1131.07

## 2019-04-16 ENCOUNTER — COMMUNICATION - HEALTHEAST (OUTPATIENT)
Dept: PALLIATIVE MEDICINE | Facility: OTHER | Age: 59
End: 2019-04-16

## 2019-04-29 ENCOUNTER — HOSPITAL ENCOUNTER (OUTPATIENT)
Dept: PHARMACY | Facility: OTHER | Age: 59
Discharge: HOME | End: 2019-04-29
Attending: PHARMACIST

## 2019-05-07 ENCOUNTER — COMMUNICATION - HEALTHEAST (OUTPATIENT)
Dept: PALLIATIVE MEDICINE | Facility: OTHER | Age: 59
End: 2019-05-07

## 2019-05-07 DIAGNOSIS — R52 PAIN: ICD-10-CM

## 2019-05-07 DIAGNOSIS — G89.29 OTHER CHRONIC PAIN: ICD-10-CM

## 2019-05-17 ENCOUNTER — COMMUNICATION - HEALTHEAST (OUTPATIENT)
Dept: PALLIATIVE MEDICINE | Facility: OTHER | Age: 59
End: 2019-05-17

## 2019-05-17 DIAGNOSIS — G89.29 CHRONIC PAIN: ICD-10-CM

## 2019-05-28 ENCOUNTER — COMMUNICATION - HEALTHEAST (OUTPATIENT)
Dept: PALLIATIVE MEDICINE | Facility: OTHER | Age: 59
End: 2019-05-28

## 2019-05-28 DIAGNOSIS — G89.29 OTHER CHRONIC PAIN: ICD-10-CM

## 2019-05-29 ENCOUNTER — AMBULATORY - HEALTHEAST (OUTPATIENT)
Dept: SCHEDULING | Facility: CLINIC | Age: 59
End: 2019-05-29

## 2019-05-29 ENCOUNTER — HOSPITAL ENCOUNTER (OUTPATIENT)
Dept: PALLIATIVE MEDICINE | Facility: OTHER | Age: 59
Discharge: HOME | End: 2019-05-29
Attending: ANESTHESIOLOGY

## 2019-05-29 DIAGNOSIS — G89.29 OTHER CHRONIC PAIN: ICD-10-CM

## 2019-05-29 DIAGNOSIS — G89.4 CHRONIC PAIN SYNDROME: ICD-10-CM

## 2019-05-29 DIAGNOSIS — R52 PAIN: ICD-10-CM

## 2019-05-29 DIAGNOSIS — G89.29 CHRONIC PAIN: ICD-10-CM

## 2019-05-29 LAB
C REACTIVE PROTEIN LHE: <0.1 MG/DL (ref 0–0.8)
RHEUMATOID FACT SERPL-ACNC: <15 IU/ML (ref 0–30)
URATE SERPL-MCNC: 2.3 MG/DL (ref 2–7.5)

## 2019-05-29 ASSESSMENT — MIFFLIN-ST. JEOR: SCORE: 1131.07

## 2019-06-04 ENCOUNTER — COMMUNICATION - HEALTHEAST (OUTPATIENT)
Dept: PALLIATIVE MEDICINE | Facility: OTHER | Age: 59
End: 2019-06-04

## 2019-06-18 ENCOUNTER — COMMUNICATION - HEALTHEAST (OUTPATIENT)
Dept: PALLIATIVE MEDICINE | Facility: OTHER | Age: 59
End: 2019-06-18

## 2019-06-18 DIAGNOSIS — G89.29 CHRONIC PAIN: ICD-10-CM

## 2019-06-26 ENCOUNTER — COMMUNICATION - HEALTHEAST (OUTPATIENT)
Dept: PALLIATIVE MEDICINE | Facility: OTHER | Age: 59
End: 2019-06-26

## 2019-06-26 DIAGNOSIS — G89.29 OTHER CHRONIC PAIN: ICD-10-CM

## 2019-07-02 ENCOUNTER — HOSPITAL ENCOUNTER (OUTPATIENT)
Dept: MRI IMAGING | Facility: CLINIC | Age: 59
Discharge: HOME | End: 2019-07-02
Attending: ANESTHESIOLOGY

## 2019-07-07 ENCOUNTER — COMMUNICATION - HEALTHEAST (OUTPATIENT)
Dept: PALLIATIVE MEDICINE | Facility: OTHER | Age: 59
End: 2019-07-07

## 2019-07-11 ENCOUNTER — COMMUNICATION - HEALTHEAST (OUTPATIENT)
Dept: PALLIATIVE MEDICINE | Facility: OTHER | Age: 59
End: 2019-07-11

## 2019-07-11 DIAGNOSIS — G89.29 OTHER CHRONIC PAIN: ICD-10-CM

## 2019-07-16 ENCOUNTER — HOSPITAL ENCOUNTER (OUTPATIENT)
Dept: PALLIATIVE MEDICINE | Facility: OTHER | Age: 59
Discharge: HOME | End: 2019-07-16
Attending: ANESTHESIOLOGY

## 2019-07-16 DIAGNOSIS — G89.4 CHRONIC PAIN SYNDROME: ICD-10-CM

## 2019-07-16 DIAGNOSIS — G89.29 OTHER CHRONIC PAIN: ICD-10-CM

## 2019-07-16 DIAGNOSIS — G89.29 CHRONIC PAIN: ICD-10-CM

## 2019-07-16 ASSESSMENT — MIFFLIN-ST. JEOR: SCORE: 1131.07

## 2019-08-12 ENCOUNTER — COMMUNICATION - HEALTHEAST (OUTPATIENT)
Dept: PALLIATIVE MEDICINE | Facility: OTHER | Age: 59
End: 2019-08-12

## 2019-08-12 DIAGNOSIS — G89.29 OTHER CHRONIC PAIN: ICD-10-CM

## 2019-08-21 ENCOUNTER — COMMUNICATION - HEALTHEAST (OUTPATIENT)
Dept: PALLIATIVE MEDICINE | Facility: OTHER | Age: 59
End: 2019-08-21

## 2019-08-21 DIAGNOSIS — G89.29 OTHER CHRONIC PAIN: ICD-10-CM

## 2019-08-21 DIAGNOSIS — G89.29 CHRONIC PAIN: ICD-10-CM

## 2019-09-10 ENCOUNTER — HOSPITAL ENCOUNTER (OUTPATIENT)
Dept: PALLIATIVE MEDICINE | Facility: OTHER | Age: 59
Discharge: HOME | End: 2019-09-10
Attending: ANESTHESIOLOGY

## 2019-09-10 DIAGNOSIS — G89.29 OTHER CHRONIC PAIN: ICD-10-CM

## 2019-09-10 DIAGNOSIS — G89.29 CHRONIC PAIN: ICD-10-CM

## 2019-09-10 ASSESSMENT — MIFFLIN-ST. JEOR: SCORE: 1131.07

## 2019-09-27 ENCOUNTER — COMMUNICATION - HEALTHEAST (OUTPATIENT)
Dept: PALLIATIVE MEDICINE | Facility: OTHER | Age: 59
End: 2019-09-27

## 2019-09-30 ENCOUNTER — COMMUNICATION - HEALTHEAST (OUTPATIENT)
Dept: PALLIATIVE MEDICINE | Facility: OTHER | Age: 59
End: 2019-09-30

## 2019-09-30 DIAGNOSIS — G89.29 OTHER CHRONIC PAIN: ICD-10-CM

## 2019-10-16 ENCOUNTER — COMMUNICATION - HEALTHEAST (OUTPATIENT)
Dept: PALLIATIVE MEDICINE | Facility: OTHER | Age: 59
End: 2019-10-16

## 2019-10-16 DIAGNOSIS — G89.29 OTHER CHRONIC PAIN: ICD-10-CM

## 2019-10-16 DIAGNOSIS — G89.29 CHRONIC PAIN: ICD-10-CM

## 2019-10-31 ENCOUNTER — COMMUNICATION - HEALTHEAST (OUTPATIENT)
Dept: PALLIATIVE MEDICINE | Facility: OTHER | Age: 59
End: 2019-10-31

## 2019-10-31 DIAGNOSIS — G89.29 OTHER CHRONIC PAIN: ICD-10-CM

## 2019-11-05 ENCOUNTER — COMMUNICATION - HEALTHEAST (OUTPATIENT)
Dept: PALLIATIVE MEDICINE | Facility: OTHER | Age: 59
End: 2019-11-05

## 2019-11-05 ENCOUNTER — HOSPITAL ENCOUNTER (OUTPATIENT)
Dept: PALLIATIVE MEDICINE | Facility: OTHER | Age: 59
Discharge: HOME | End: 2019-11-05
Attending: ANESTHESIOLOGY

## 2019-11-05 DIAGNOSIS — G58.8 OTHER SPECIFIED MONONEUROPATHIES: ICD-10-CM

## 2019-11-05 DIAGNOSIS — K59.03 DRUG-INDUCED CONSTIPATION: ICD-10-CM

## 2019-11-05 DIAGNOSIS — G89.4 CHRONIC PAIN SYNDROME: ICD-10-CM

## 2019-11-05 ASSESSMENT — MIFFLIN-ST. JEOR: SCORE: 1131.07

## 2019-11-08 LAB
6MAM UR QL: NOT DETECTED
7AMINOCLONAZEPAM UR QL: NOT DETECTED
A-OH ALPRAZ UR QL: NOT DETECTED
ALPRAZ UR QL: NOT DETECTED
AMPHET UR QL SCN: NOT DETECTED
BARBITURATES UR QL: NOT DETECTED
BUPRENORPHINE UR QL: NOT DETECTED
BZE UR QL: NOT DETECTED
CARBOXYTHC UR QL: NOT DETECTED
CARISOPRODOL UR QL: NOT DETECTED
CLONAZEPAM UR QL: NOT DETECTED
CODEINE UR QL: NOT DETECTED
CREAT UR-MCNC: 63.3 MG/DL (ref 20–400)
DIAZEPAM UR QL: NOT DETECTED
ETHYL GLUCURONIDE UR QL: NOT DETECTED
FENTANYL UR QL: PRESENT
HYDROCODONE UR QL: NOT DETECTED
HYDROMORPHONE UR QL: PRESENT
LORAZEPAM UR QL: NOT DETECTED
MDA UR QL: NOT DETECTED
MDEA UR QL: NOT DETECTED
MDMA UR QL: NOT DETECTED
ME-PHENIDATE UR QL: NOT DETECTED
MEPERIDINE UR QL: NOT DETECTED
METHADONE UR QL: NOT DETECTED
METHAMPHET UR QL: NOT DETECTED
MIDAZOLAM UR QL SCN: NOT DETECTED
MORPHINE UR QL: NOT DETECTED
NORBUPRENORPHINE UR QL CFM: NOT DETECTED
NORDIAZEPAM UR QL: NOT DETECTED
NORFENTANYL UR QL: PRESENT
NORHYDROCODONE UR QL CFM: NOT DETECTED
NOROXYCODONE UR QL CFM: NOT DETECTED
NOROXYMORPHONE UR QL SCN: NOT DETECTED
OXAZEPAM UR QL: NOT DETECTED
OXYCODONE UR QL: NOT DETECTED
OXYMORPHONE UR QL: NOT DETECTED
PATHOLOGY STUDY: NORMAL
PCP UR QL: NOT DETECTED
PHENTERMINE UR QL: NOT DETECTED
PROPOXYPH UR QL: NOT DETECTED
SERVICE CMNT-IMP: NORMAL
TAPENTADOL UR QL SCN: NOT DETECTED
TAPENTADOL UR QL SCN: NOT DETECTED
TEMAZEPAM UR QL: NOT DETECTED
TRAMADOL UR QL: NOT DETECTED
ZOLPIDEM UR QL: NOT DETECTED

## 2019-11-13 ENCOUNTER — COMMUNICATION - HEALTHEAST (OUTPATIENT)
Dept: PALLIATIVE MEDICINE | Facility: OTHER | Age: 59
End: 2019-11-13

## 2019-11-13 DIAGNOSIS — G89.29 OTHER CHRONIC PAIN: ICD-10-CM

## 2019-11-19 ENCOUNTER — COMMUNICATION - HEALTHEAST (OUTPATIENT)
Dept: PALLIATIVE MEDICINE | Facility: OTHER | Age: 59
End: 2019-11-19

## 2019-11-19 DIAGNOSIS — G89.29 CHRONIC PAIN: ICD-10-CM

## 2019-12-02 ENCOUNTER — COMMUNICATION - HEALTHEAST (OUTPATIENT)
Dept: PALLIATIVE MEDICINE | Facility: OTHER | Age: 59
End: 2019-12-02

## 2019-12-02 DIAGNOSIS — G89.29 OTHER CHRONIC PAIN: ICD-10-CM

## 2019-12-18 ENCOUNTER — COMMUNICATION - HEALTHEAST (OUTPATIENT)
Dept: PALLIATIVE MEDICINE | Facility: OTHER | Age: 59
End: 2019-12-18

## 2019-12-18 DIAGNOSIS — G89.29 OTHER CHRONIC PAIN: ICD-10-CM

## 2019-12-26 ENCOUNTER — COMMUNICATION - HEALTHEAST (OUTPATIENT)
Dept: PALLIATIVE MEDICINE | Facility: CLINIC | Age: 59
End: 2019-12-26

## 2019-12-26 DIAGNOSIS — M54.6 PAIN IN THORACIC SPINE: ICD-10-CM

## 2019-12-31 ENCOUNTER — HOSPITAL ENCOUNTER (OUTPATIENT)
Dept: PALLIATIVE MEDICINE | Facility: OTHER | Age: 59
Discharge: HOME | End: 2019-12-31
Attending: ANESTHESIOLOGY

## 2019-12-31 DIAGNOSIS — M54.6 PAIN IN THORACIC SPINE: ICD-10-CM

## 2019-12-31 DIAGNOSIS — G89.29 OTHER CHRONIC PAIN: ICD-10-CM

## 2019-12-31 ASSESSMENT — MIFFLIN-ST. JEOR: SCORE: 1131.07

## 2020-01-03 ENCOUNTER — COMMUNICATION - HEALTHEAST (OUTPATIENT)
Dept: PALLIATIVE MEDICINE | Facility: OTHER | Age: 60
End: 2020-01-03

## 2020-01-20 ENCOUNTER — COMMUNICATION - HEALTHEAST (OUTPATIENT)
Dept: PALLIATIVE MEDICINE | Facility: OTHER | Age: 60
End: 2020-01-20

## 2020-01-20 DIAGNOSIS — G89.29 OTHER CHRONIC PAIN: ICD-10-CM

## 2020-01-27 ENCOUNTER — COMMUNICATION - HEALTHEAST (OUTPATIENT)
Dept: PALLIATIVE MEDICINE | Facility: OTHER | Age: 60
End: 2020-01-27

## 2020-01-27 DIAGNOSIS — M54.6 PAIN IN THORACIC SPINE: ICD-10-CM

## 2020-02-04 ENCOUNTER — COMMUNICATION - HEALTHEAST (OUTPATIENT)
Dept: PALLIATIVE MEDICINE | Facility: OTHER | Age: 60
End: 2020-02-04

## 2020-02-04 DIAGNOSIS — G89.29 OTHER CHRONIC PAIN: ICD-10-CM

## 2020-02-19 ENCOUNTER — COMMUNICATION - HEALTHEAST (OUTPATIENT)
Dept: PALLIATIVE MEDICINE | Facility: OTHER | Age: 60
End: 2020-02-19

## 2020-02-19 DIAGNOSIS — G89.29 OTHER CHRONIC PAIN: ICD-10-CM

## 2020-02-25 ENCOUNTER — COMMUNICATION - HEALTHEAST (OUTPATIENT)
Dept: PALLIATIVE MEDICINE | Facility: OTHER | Age: 60
End: 2020-02-25

## 2020-02-25 DIAGNOSIS — M54.6 PAIN IN THORACIC SPINE: ICD-10-CM

## 2020-03-04 ENCOUNTER — HOSPITAL ENCOUNTER (OUTPATIENT)
Dept: PALLIATIVE MEDICINE | Facility: OTHER | Age: 60
Discharge: HOME | End: 2020-03-04
Attending: ANESTHESIOLOGY

## 2020-03-04 DIAGNOSIS — G89.4 CHRONIC PAIN SYNDROME: ICD-10-CM

## 2020-03-04 DIAGNOSIS — Z79.891 LONG TERM (CURRENT) USE OF OPIATE ANALGESIC: ICD-10-CM

## 2020-03-04 DIAGNOSIS — E55.9 VITAMIN D DEFICIENCY, UNSPECIFIED: ICD-10-CM

## 2020-03-04 DIAGNOSIS — G89.29 OTHER CHRONIC PAIN: ICD-10-CM

## 2020-03-04 ASSESSMENT — MIFFLIN-ST. JEOR: SCORE: 1194.57

## 2020-03-05 ENCOUNTER — COMMUNICATION - HEALTHEAST (OUTPATIENT)
Dept: PALLIATIVE MEDICINE | Facility: OTHER | Age: 60
End: 2020-03-05

## 2020-03-05 DIAGNOSIS — M54.6 PAIN IN THORACIC SPINE: ICD-10-CM

## 2020-03-05 LAB
25(OH)D3 SERPL-MCNC: 24.8 NG/ML (ref 30–80)
HBA1C MFR BLD: 5.4 %

## 2020-03-16 ENCOUNTER — COMMUNICATION - HEALTHEAST (OUTPATIENT)
Dept: PALLIATIVE MEDICINE | Facility: OTHER | Age: 60
End: 2020-03-16

## 2020-03-17 ENCOUNTER — COMMUNICATION - HEALTHEAST (OUTPATIENT)
Dept: PALLIATIVE MEDICINE | Facility: OTHER | Age: 60
End: 2020-03-17

## 2020-03-17 DIAGNOSIS — G89.29 OTHER CHRONIC PAIN: ICD-10-CM

## 2020-03-26 ENCOUNTER — COMMUNICATION - HEALTHEAST (OUTPATIENT)
Dept: PALLIATIVE MEDICINE | Facility: OTHER | Age: 60
End: 2020-03-26

## 2020-03-26 DIAGNOSIS — M54.6 PAIN IN THORACIC SPINE: ICD-10-CM

## 2020-04-01 ENCOUNTER — COMMUNICATION - HEALTHEAST (OUTPATIENT)
Dept: PALLIATIVE MEDICINE | Facility: OTHER | Age: 60
End: 2020-04-01

## 2020-04-01 DIAGNOSIS — F11.20 OPIOID TYPE DEPENDENCE, CONTINUOUS (H): ICD-10-CM

## 2020-04-01 DIAGNOSIS — M54.6 PAIN IN THORACIC SPINE: ICD-10-CM

## 2020-04-01 DIAGNOSIS — G89.29 OTHER CHRONIC PAIN: ICD-10-CM

## 2020-04-01 DIAGNOSIS — F32.A DEPRESSION: ICD-10-CM

## 2020-04-01 RX ORDER — LAMOTRIGINE 200 MG/1
200 TABLET ORAL DAILY
Qty: 30 TABLET | Refills: 2 | Status: SHIPPED | OUTPATIENT
Start: 2020-04-01

## 2020-04-01 RX ORDER — VENLAFAXINE HYDROCHLORIDE 150 MG/1
300 CAPSULE, EXTENDED RELEASE ORAL DAILY
Qty: 60 CAPSULE | Refills: 0 | Status: SHIPPED | OUTPATIENT
Start: 2020-04-01

## 2020-05-08 ENCOUNTER — COMMUNICATION - HEALTHEAST (OUTPATIENT)
Dept: PALLIATIVE MEDICINE | Facility: OTHER | Age: 60
End: 2020-05-08

## 2020-05-08 DIAGNOSIS — F11.93 OPIATE WITHDRAWAL (H): ICD-10-CM

## 2020-05-08 DIAGNOSIS — G89.29 CHRONIC PAIN: ICD-10-CM

## 2020-05-08 DIAGNOSIS — G89.29 OTHER CHRONIC PAIN: ICD-10-CM

## 2020-05-19 ENCOUNTER — HOSPITAL ENCOUNTER (OUTPATIENT)
Dept: PALLIATIVE MEDICINE | Facility: OTHER | Age: 60
Discharge: HOME | End: 2020-05-19
Attending: ANESTHESIOLOGY

## 2020-05-19 DIAGNOSIS — M54.6 PAIN IN THORACIC SPINE: ICD-10-CM

## 2020-05-19 DIAGNOSIS — G89.29 OTHER CHRONIC PAIN: ICD-10-CM

## 2020-05-19 RX ORDER — CHOLECALCIFEROL (VITAMIN D3) 50 MCG
1 TABLET ORAL DAILY
Status: SHIPPED | COMMUNITY
Start: 2020-03-07

## 2020-06-09 ENCOUNTER — COMMUNICATION - HEALTHEAST (OUTPATIENT)
Dept: PALLIATIVE MEDICINE | Facility: OTHER | Age: 60
End: 2020-06-09

## 2020-06-09 DIAGNOSIS — G89.29 OTHER CHRONIC PAIN: ICD-10-CM

## 2020-06-26 ENCOUNTER — COMMUNICATION - HEALTHEAST (OUTPATIENT)
Dept: PALLIATIVE MEDICINE | Facility: OTHER | Age: 60
End: 2020-06-26

## 2020-06-26 DIAGNOSIS — M54.6 PAIN IN THORACIC SPINE: ICD-10-CM

## 2020-06-26 DIAGNOSIS — G89.29 OTHER CHRONIC PAIN: ICD-10-CM

## 2020-07-15 ENCOUNTER — HOSPITAL ENCOUNTER (OUTPATIENT)
Dept: PALLIATIVE MEDICINE | Facility: OTHER | Age: 60
Discharge: HOME | End: 2020-07-15
Attending: ANESTHESIOLOGY

## 2020-07-15 DIAGNOSIS — G89.29 OTHER CHRONIC PAIN: ICD-10-CM

## 2020-07-15 DIAGNOSIS — M54.6 PAIN IN THORACIC SPINE: ICD-10-CM

## 2020-07-30 ENCOUNTER — COMMUNICATION - HEALTHEAST (OUTPATIENT)
Dept: PALLIATIVE MEDICINE | Facility: OTHER | Age: 60
End: 2020-07-30

## 2020-08-10 ENCOUNTER — COMMUNICATION - HEALTHEAST (OUTPATIENT)
Dept: PALLIATIVE MEDICINE | Facility: OTHER | Age: 60
End: 2020-08-10

## 2020-08-10 DIAGNOSIS — G89.29 OTHER CHRONIC PAIN: ICD-10-CM

## 2020-09-08 ENCOUNTER — COMMUNICATION - HEALTHEAST (OUTPATIENT)
Dept: PALLIATIVE MEDICINE | Facility: OTHER | Age: 60
End: 2020-09-08

## 2020-09-08 DIAGNOSIS — M54.6 PAIN IN THORACIC SPINE: ICD-10-CM

## 2020-09-16 ENCOUNTER — HOSPITAL ENCOUNTER (OUTPATIENT)
Dept: PALLIATIVE MEDICINE | Facility: OTHER | Age: 60
Discharge: HOME | End: 2020-09-16
Attending: ANESTHESIOLOGY

## 2020-09-16 DIAGNOSIS — M54.6 PAIN IN THORACIC SPINE: ICD-10-CM

## 2020-09-16 DIAGNOSIS — G89.29 OTHER CHRONIC PAIN: ICD-10-CM

## 2020-10-05 ENCOUNTER — COMMUNICATION - HEALTHEAST (OUTPATIENT)
Dept: PALLIATIVE MEDICINE | Facility: OTHER | Age: 60
End: 2020-10-05

## 2020-10-05 DIAGNOSIS — G89.29 OTHER CHRONIC PAIN: ICD-10-CM

## 2020-10-19 ENCOUNTER — COMMUNICATION - HEALTHEAST (OUTPATIENT)
Dept: PALLIATIVE MEDICINE | Facility: OTHER | Age: 60
End: 2020-10-19

## 2020-10-19 DIAGNOSIS — G89.29 OTHER CHRONIC PAIN: ICD-10-CM

## 2020-10-26 ENCOUNTER — COMMUNICATION - HEALTHEAST (OUTPATIENT)
Dept: PALLIATIVE MEDICINE | Facility: OTHER | Age: 60
End: 2020-10-26

## 2020-10-26 DIAGNOSIS — M54.6 PAIN IN THORACIC SPINE: ICD-10-CM

## 2020-10-26 DIAGNOSIS — G89.29 OTHER CHRONIC PAIN: ICD-10-CM

## 2020-11-16 ENCOUNTER — COMMUNICATION - HEALTHEAST (OUTPATIENT)
Dept: PALLIATIVE MEDICINE | Facility: OTHER | Age: 60
End: 2020-11-16

## 2020-11-16 DIAGNOSIS — M54.6 PAIN IN THORACIC SPINE: ICD-10-CM

## 2020-11-30 ENCOUNTER — COMMUNICATION - HEALTHEAST (OUTPATIENT)
Dept: PALLIATIVE MEDICINE | Facility: OTHER | Age: 60
End: 2020-11-30

## 2020-11-30 DIAGNOSIS — G89.29 OTHER CHRONIC PAIN: ICD-10-CM

## 2020-11-30 DIAGNOSIS — M54.6 PAIN IN THORACIC SPINE: ICD-10-CM

## 2020-12-07 ENCOUNTER — COMMUNICATION - HEALTHEAST (OUTPATIENT)
Dept: PALLIATIVE MEDICINE | Facility: OTHER | Age: 60
End: 2020-12-07

## 2020-12-07 DIAGNOSIS — M54.6 PAIN IN THORACIC SPINE: ICD-10-CM

## 2020-12-16 ENCOUNTER — HOSPITAL ENCOUNTER (OUTPATIENT)
Dept: PALLIATIVE MEDICINE | Facility: OTHER | Age: 60
Discharge: HOME | End: 2020-12-16
Attending: ANESTHESIOLOGY

## 2020-12-16 DIAGNOSIS — G89.29 OTHER CHRONIC PAIN: ICD-10-CM

## 2020-12-16 DIAGNOSIS — M54.6 PAIN IN THORACIC SPINE: ICD-10-CM

## 2020-12-17 ENCOUNTER — HOSPITAL ENCOUNTER (OUTPATIENT)
Dept: PALLIATIVE MEDICINE | Facility: OTHER | Age: 60
Discharge: HOME | End: 2020-12-17

## 2020-12-17 ENCOUNTER — RECORDS - HEALTHEAST (OUTPATIENT)
Dept: ADMINISTRATIVE | Facility: OTHER | Age: 60
End: 2020-12-17

## 2020-12-17 DIAGNOSIS — G89.4 CHRONIC PAIN SYNDROME: ICD-10-CM

## 2020-12-19 LAB
6MAM UR QL: NOT DETECTED
7AMINOCLONAZEPAM UR QL: NOT DETECTED
A-OH ALPRAZ UR QL: NOT DETECTED
ALPRAZ UR QL: NOT DETECTED
AMPHET UR QL SCN: NOT DETECTED
BARBITURATES UR QL: NOT DETECTED
BUPRENORPHINE UR QL: NOT DETECTED
BZE UR QL: NOT DETECTED
CARBOXYTHC UR QL: NOT DETECTED
CARISOPRODOL UR QL: NOT DETECTED
CLONAZEPAM UR QL: NOT DETECTED
CODEINE UR QL: NOT DETECTED
CREAT UR-MCNC: 86.6 MG/DL (ref 20–400)
DIAZEPAM UR QL: NOT DETECTED
ETHYL GLUCURONIDE UR QL: NOT DETECTED
FENTANYL UR QL: NOT DETECTED
HYDROCODONE UR QL: NOT DETECTED
HYDROMORPHONE UR QL: NOT DETECTED
LORAZEPAM UR QL: NOT DETECTED
MDA UR QL: NOT DETECTED
MDEA UR QL: NOT DETECTED
MDMA UR QL: NOT DETECTED
ME-PHENIDATE UR QL: NOT DETECTED
MEPERIDINE UR QL: NOT DETECTED
METHADONE UR QL: NOT DETECTED
METHAMPHET UR QL: NOT DETECTED
MIDAZOLAM UR QL SCN: NOT DETECTED
MORPHINE UR QL: NOT DETECTED
NORBUPRENORPHINE UR QL CFM: NOT DETECTED
NORDIAZEPAM UR QL: NOT DETECTED
NORFENTANYL UR QL: PRESENT
NORHYDROCODONE UR QL CFM: NOT DETECTED
NOROXYCODONE UR QL CFM: PRESENT
NOROXYMORPHONE UR QL SCN: NOT DETECTED
OXAZEPAM UR QL: NOT DETECTED
OXYCODONE UR QL: PRESENT
OXYMORPHONE UR QL: NOT DETECTED
PATHOLOGY STUDY: NORMAL
PCP UR QL: NOT DETECTED
PHENTERMINE UR QL: NOT DETECTED
PROPOXYPH UR QL: NOT DETECTED
SERVICE CMNT-IMP: NORMAL
TAPENTADOL UR QL SCN: NOT DETECTED
TAPENTADOL UR QL SCN: NOT DETECTED
TEMAZEPAM UR QL: NOT DETECTED
TRAMADOL UR QL: NOT DETECTED
ZOLPIDEM UR QL: NOT DETECTED

## 2020-12-23 ENCOUNTER — COMMUNICATION - HEALTHEAST (OUTPATIENT)
Dept: PALLIATIVE MEDICINE | Facility: OTHER | Age: 60
End: 2020-12-23

## 2020-12-23 DIAGNOSIS — G89.29 OTHER CHRONIC PAIN: ICD-10-CM

## 2021-01-12 ENCOUNTER — COMMUNICATION - HEALTHEAST (OUTPATIENT)
Dept: PALLIATIVE MEDICINE | Facility: OTHER | Age: 61
End: 2021-01-12

## 2021-01-12 DIAGNOSIS — G89.29 OTHER CHRONIC PAIN: ICD-10-CM

## 2021-02-16 ENCOUNTER — COMMUNICATION - HEALTHEAST (OUTPATIENT)
Dept: PALLIATIVE MEDICINE | Facility: OTHER | Age: 61
End: 2021-02-16

## 2021-02-16 DIAGNOSIS — M54.6 PAIN IN THORACIC SPINE: ICD-10-CM

## 2021-03-03 ENCOUNTER — HOSPITAL ENCOUNTER (OUTPATIENT)
Dept: PALLIATIVE MEDICINE | Facility: OTHER | Age: 61
Discharge: HOME | End: 2021-03-03
Attending: ANESTHESIOLOGY

## 2021-03-03 DIAGNOSIS — G89.29 OTHER CHRONIC PAIN: ICD-10-CM

## 2021-03-03 DIAGNOSIS — M54.6 PAIN IN THORACIC SPINE: ICD-10-CM

## 2021-03-03 DIAGNOSIS — M47.812 CERVICAL SPONDYLOSIS WITHOUT MYELOPATHY: ICD-10-CM

## 2021-03-07 RX ORDER — DOCUSATE SODIUM 100 MG/1
100 CAPSULE, LIQUID FILLED ORAL 2 TIMES DAILY PRN
Qty: 60 CAPSULE | Refills: 2 | Status: SHIPPED | OUTPATIENT
Start: 2021-03-07

## 2021-04-26 ENCOUNTER — COMMUNICATION - HEALTHEAST (OUTPATIENT)
Dept: PALLIATIVE MEDICINE | Facility: OTHER | Age: 61
End: 2021-04-26

## 2021-04-26 DIAGNOSIS — G89.29 OTHER CHRONIC PAIN: ICD-10-CM

## 2021-04-26 DIAGNOSIS — M54.6 PAIN IN THORACIC SPINE: ICD-10-CM

## 2021-05-26 ENCOUNTER — HOSPITAL ENCOUNTER (OUTPATIENT)
Dept: PALLIATIVE MEDICINE | Facility: OTHER | Age: 61
Discharge: HOME | End: 2021-05-26
Attending: ANESTHESIOLOGY

## 2021-05-26 DIAGNOSIS — M54.6 PAIN IN THORACIC SPINE: ICD-10-CM

## 2021-05-26 DIAGNOSIS — G89.29 OTHER CHRONIC PAIN: ICD-10-CM

## 2021-05-27 NOTE — PATIENT INSTRUCTIONS - HE
PLAN:    Continue Fentanyl 25 mcg , morphine 15 mg three times daily, hydromorphone 4 mg four times a day    Discussed Frequency Specific Microcurrent as modality that may help back pain, pelvic pain, foot pain, may call [facility] [phone].      Dr. Vazquez will offer FSM when program established    Return in 8 weeks

## 2021-05-27 NOTE — PROGRESS NOTES
Pt is being seen today by Tyler Vazquez MD, for refills and f/u of 6-constant, sharp, achy back and lt foot pain,  F7

## 2021-05-27 NOTE — PROGRESS NOTES
"Assessment/Plan:     Problem List Items Addressed This Visit     Chronic Pain    Relevant Medications    HYDROmorphone (DILAUDID) 4 MG tablet (Start on 4/15/2019)      Other Visit Diagnoses     Chronic pain syndrome    -  Primary    Pain        Relevant Medications    morphine (MSIR) 15 MG tablet              No Follow-up on file.    Patient Instructions   PLAN:    Continue Fentanyl 25 mcg , morphine 15 mg three times daily, hydromorphone 4 mg four times a day    Discussed Frequency Specific Microcurrent as modality that may help back pain, pelvic pain, foot pain, may call [facility] [phone].      Dr. Vazquez will offer FSM when program established    Return in 8 weeks        Subjective:       58 y.o. female presents for evaluation abdominal pain, back pain.    Since last seen she reviews having a colonoscopy last week, she is relieved that there were no polyps.    Reviews that her daughter will be moving out the living in town, another daughter lives close by.  She is \"scared\", concerned if she were to fall and hit her head, or fall on the stairs.  She notes she has slipped on the ice outside, and has tripped without problems but in general is feeling concerned.    She continues with back pain that varies.  Sometimes it goes down to her left foot.  She notes the left foot involvement was after a gynecologic surgery which was told the nerve was damaged.  Her graph reviews a stressor with her brother diagnosed with.    She has decreased her fentanyl patch from the 25 in the 12 to just the 25 mcg patch.  She uses the morphine 15 mg immediate release usually 4 mg 4 times a day.  She is not clear how one works differently, just finds alternating them through the day has been helping to stabilize.    Her insurance did not cover the Movantik for the Relistor.  She has been using Mylanta.  She is concerned if she uses it 3 days in a row she can have some rectal leakage at night which bothers her.    Since " last seen she did call your gynecologist though notes for pelvic floor physical therapy was canceled due to the snow.  We had also made a point for a trigger point injection abdominally though the weather canceled that.    She continues taking Lamictal 200 mg daily which she does feel helps stabilize her mood.      Current Outpatient Medications:      buPROPion (WELLBUTRIN XL) 150 MG 24 hr tablet, Take 450 mg by mouth daily., Disp: , Rfl:      fentaNYL (DURAGESIC) 25 mcg/hr, Place 1 patch on the skin every third day., Disp: , Rfl:      [START ON 4/15/2019] HYDROmorphone (DILAUDID) 4 MG tablet, Take 1 tablet (4 mg total) by mouth every 6 (six) hours as needed for pain., Disp: 70 tablet, Rfl: 0     lamoTRIgine (LAMICTAL) 200 MG tablet, Take 200 mg by mouth., Disp: , Rfl:      morphine (MSIR) 15 MG tablet, Take 1 tablet (15 mg total) by mouth 3 (three) times a day as needed for pain., Disp: 84 tablet, Rfl: 0     naloxone (NARCAN) 4 mg/actuation nasal spray, 1 spray (4 mg dose) into one nostril for opioid reversal. Call 911. May repeat if no response in 3 minutes., Disp: 1 Box, Rfl: 0     QUEtiapine (SEROQUEL) 25 MG tablet, Take 25 mg by mouth at bedtime. Can take up to 3 a night, Disp: , Rfl:      traZODone (DESYREL) 300 MG tablet, TK 1 T PO QD HS, Disp: , Rfl: 2     venlafaxine (EFFEXOR-XR) 150 MG 24 hr capsule, TAKE 2 CAPSULES BY MOUTH EVERY DAY, Disp: 60 capsule, Rfl: 0     clindamycin (CLEOCIN) 300 MG capsule, One and a half tabs (450 mg) by mouth 3 times a day for 5 days, Disp: 23 capsule, Rfl: 0     docusate sodium (COLACE) 100 MG capsule, Take 1 capsule (100 mg total) by mouth 2 (two) times a day., Disp: 60 capsule, Rfl: 2     magnesium oxide (MAGOX) 400 mg (241.3 mg magnesium) tablet, One tab up to four times a day, Disp: 90 tablet, Rfl: 2     medroxyPROGESTERone (PROVERA) 10 MG tablet, Take 1 tablet by mouth daily. Once daily for 12 days out of the month, Disp: , Rfl: 0     methylnaltrexone (RELISTOR) 150 mg  "Tab, Take 450 mg by mouth daily., Disp: 30 tablet, Rfl: 2     naloxegol (MOVANTIK) 25 mg Tab tablet, Take 1 tablet (25 mg total) by mouth daily., Disp: 30 tablet, Rfl: 0           Objective:     Vitals:    04/02/19 1250   BP: 110/70   Pulse: 99   Weight: 120 lb (54.4 kg)   Height: 5' 6\" (1.676 m)   PainSc:   6   PainLoc: Back       She is alert with a clear sensorium good eye contact.  Thought process tight logical.  She is doing a crossword puzzle.  She does have tremors in her extremities as at baseline.    Reviewed today the modality of frequency specific microcurrent that may help her pelvic pain, adhesions, nerve involvement.  Present she cannot afford any out-of-pocket expenses.  Will contact her when we have been able to establish the program.    She will continue to limit the opioids as above, is not noting any pulmonary concerns on this regimen.    Reviewed some of the above concerns with her daughters in health.    Time spent more than 25 minutes face-to-face, 50% count about above condition and coordination of treatment plan    This note has been dictated using voice recognition software. Any grammatical or context distortions are unintentional and inherent to the software  "

## 2021-05-28 ENCOUNTER — RECORDS - HEALTHEAST (OUTPATIENT)
Dept: ADMINISTRATIVE | Facility: OTHER | Age: 61
End: 2021-05-28

## 2021-05-28 DIAGNOSIS — M54.6 PAIN IN THORACIC SPINE: ICD-10-CM

## 2021-05-28 RX ORDER — FENTANYL 25 UG/1
1 PATCH TRANSDERMAL
Qty: 10 PATCH | Refills: 0 | Status: SHIPPED | OUTPATIENT
Start: 2021-05-28 | End: 2021-06-27

## 2021-05-29 NOTE — PROGRESS NOTES
"Assessment/Plan:     Problem List Items Addressed This Visit     Chronic Pain    Relevant Medications    HYDROmorphone (DILAUDID) 4 MG tablet      Other Visit Diagnoses     Chronic pain syndrome    -  Primary    Chronic pain        Relevant Medications    tiZANidine (ZANAFLEX) 4 MG tablet    Other Relevant Orders    MR Lumbar Spine Without Contrast    Pain        Relevant Orders    C-Reactive Protein (CRP) (Completed)    Uric Acid (Completed)    Rheumatoid Factor Screen (Completed)    Ambulatory referral to Rheumatology            No follow-ups on file.    Patient Instructions   PLAN:    Labs today at hospital    Referral to Rheumatologist    MRI of neck, back, take Fentanyl patch off before exam    Hydromorphone may increase to 4 mg six times a day    Stop Morphine    tizanidiine 4 mg one-half tab 3-4 times a day for 4 days, may increase to one tab 3-4 times a day, may repeat if wake in midde of night, caution low blood pressure    Return in 4-5 weeks        Subjective:       58 y.o. female presents for evaluation of abdominal pain previously.  More recently she is struggling neck and back pain.    She describes she is \"not doing well\".  She describes having more pain in her neck and shoulders particularly right shoulder.  She also having more problems in her lower back particular the right side.  She is been getting some spasms.  She notes she may have flared this up as she was doing more cleaning reaching and lifting recently.  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    Reviewing the records she last had imaging in 2009 showing degenerative disc disease and some L5 disc involvement on the left side.  She notes the pain seems to move back and forth.    She also demonstrates on her right fifth finger MCP it is gotten significant swelling over a few weeks.  She notes no particular " "precipitant is not particular warm to the touch.    She has been using the hydro-more phone 4 mg 4 times a day.  She wonders about increasing to 6 times a day as she had done better.  We did added morphine 15 mg immediate release and she has not found that particularly better.  Continues with the fentanyl 25 mcg patch.    Reviewed muscle spasm agents, use Flexeril in the past.  Her graph continues with poor sleep taking trazodone 3 to milligrams at night, quetiapine 75 mg at night.  Reviews that she has been \"squirmy\" before the Seroquel was started.      Current Outpatient Medications:      buPROPion (WELLBUTRIN XL) 150 MG 24 hr tablet, Take 450 mg by mouth daily., Disp: , Rfl:      clindamycin (CLEOCIN) 300 MG capsule, One and a half tabs (450 mg) by mouth 3 times a day for 5 days, Disp: 23 capsule, Rfl: 0     docusate sodium (COLACE) 100 MG capsule, Take 1 capsule (100 mg total) by mouth 2 (two) times a day., Disp: 60 capsule, Rfl: 2     fentaNYL (DURAGESIC) 25 mcg/hr, Place 1 patch on the skin every third day., Disp: 9 patch, Rfl: 0     lamoTRIgine (LAMICTAL) 200 MG tablet, Take 200 mg by mouth., Disp: , Rfl:      magnesium oxide (MAGOX) 400 mg (241.3 mg magnesium) tablet, One tab up to four times a day, Disp: 90 tablet, Rfl: 2     medroxyPROGESTERone (PROVERA) 10 MG tablet, Take 1 tablet by mouth daily. Once daily for 12 days out of the month, Disp: , Rfl: 0     methylnaltrexone (RELISTOR) 150 mg Tab, Take 450 mg by mouth daily., Disp: 30 tablet, Rfl: 2     naloxegol (MOVANTIK) 25 mg Tab tablet, Take 1 tablet (25 mg total) by mouth daily., Disp: 30 tablet, Rfl: 0     naloxone (NARCAN) 4 mg/actuation nasal spray, 1 spray (4 mg dose) into one nostril for opioid reversal. Call 911. May repeat if no response in 3 minutes., Disp: 1 Box, Rfl: 0     QUEtiapine (SEROQUEL) 25 MG tablet, Take 25 mg by mouth at bedtime. Can take up to 3 a night, Disp: , Rfl:      traZODone (DESYREL) 300 MG tablet, TK 1 T PO QD HS, Disp: , " "Rfl: 2     venlafaxine (EFFEXOR-XR) 150 MG 24 hr capsule, TAKE 2 CAPSULES BY MOUTH EVERY DAY, Disp: 60 capsule, Rfl: 0     HYDROmorphone (DILAUDID) 4 MG tablet, Take 1 tablet (4 mg total) by mouth every 4 (four) hours as needed for pain., Disp: 84 tablet, Rfl: 0     tiZANidine (ZANAFLEX) 4 MG tablet, One-half tab four times a day, may increase after 4 days to one four times a day, Disp: 75 tablet, Rfl: 2  Is alert with a clear sensorium good eye contact affect is constricted.  She has restless movements at baseline.  Her right fifth finger is the MP MCP joint indeed quite enlarged, somewhat erythematous, is not warm.  Is not particular tender to palpation, is painful on full flexion.    Her trapezius are markedly increased tone and spasm particular on the right which she states she has been told in the past.  Her lumbar paraspinals are also quite spasm and tender particularly on the left.  Straight leg raising on the left side flares of pain on the right side, as does right straight leg raising.  Patellar reflexes are equal.         Objective:     Vitals:    05/29/19 1319   BP: 105/58   Pulse: 88   Resp: 16   Weight: 120 lb (54.4 kg)   Height: 5' 6\" (1.676 m)   PainSc:   8         Discussed the plan to get some laboratories for inflammation and a referral to rheumatology.    We will obtain cervical and lumbar MRIs as this is a flareup from previous, will remove the fentanyl patch before the study..    We will have a trial of tizanidine for muscle spasms side effects discussed.      Time spent more than 25 minutes face-to-face, 50% count about above condition coordination treatment plan    We will stop the morphine and increase the hydromorphone to 4 mg 6 times a day.              This note has been dictated using voice recognition software. Any grammatical or context distortions are unintentional and inherent to the software  "

## 2021-05-29 NOTE — TELEPHONE ENCOUNTER
The patient's call describing a difficult weekend when she discontinued the morphine and increase the hydromorphone to 6 times a day.  We discussed that we can make adjustments, though do not want to continue with 3 different opioids used regularly.  Consider going up on the morphine with the hydromorphone and coming off the fentanyl patch.  She is concerned about that.  She will give some further thought as to what combination might be most helpful for her long-term.

## 2021-05-29 NOTE — PATIENT INSTRUCTIONS - HE
PLAN:    Labs today at hospital    Referral to Rheumatologist    MRI of neck, back, take Fentanyl patch off before exam    Hydromorphone may increase to 4 mg six times a day    Stop Morphine    tizanidiine 4 mg one-half tab 3-4 times a day for 4 days, may increase to one tab 3-4 times a day, may repeat if wake in midde of night, caution low blood pressure    Return in 4-5 weeks

## 2021-05-29 NOTE — TELEPHONE ENCOUNTER
"Left message after hrs. Had a bad weekend. Was told to drop the Morphine and continue the Hydromorphone and Fentanyl. Not doing well, so wondering about continuing the Morphine.    On last OV, dated 5/29.     She has been using the hydro-more phone 4 mg 4 times a day.  She wonders about increasing to 6 times a day as she had done better.  We did added morphine 15 mg immediate release and she has not found that particularly better.  Continues with the fentanyl 25 mcg patch.    I spoke with patient this am. She was still in bed. Had a bad weekend with \"such bad stomach pain\". Having pain from the mesh. Was hoping to be able to take Morphine when pain gets this bad. I reviewed her last visit, that Hydromorphone increased to help since Morphine dcd. Also using Fentanyl patch. She said, hoping for all 3 if needed. I told her would update Dr Vazquez, but needs to follow POC.  "

## 2021-05-30 VITALS — HEIGHT: 66 IN | WEIGHT: 115 LBS | BODY MASS INDEX: 18.48 KG/M2

## 2021-05-30 VITALS — BODY MASS INDEX: 18.48 KG/M2 | HEIGHT: 66 IN | WEIGHT: 115 LBS

## 2021-05-30 NOTE — PATIENT INSTRUCTIONS - HE
PLAN:    Baclofen for muscle spasms 10 mg three times a day    Sign release of information for Dr. Vazquez to contact [facility] to see what previous injections you had there that helped.    May contact Dr. Cedrick Love for electromagnetic therapy that may help foot pain, back pain    Return in 8 weeks

## 2021-05-30 NOTE — PROGRESS NOTES
Pt is being seen today by Tyler Vazquez MD, for refills and f/u of 9-constant, achy, sharp, back, lt foot and bilat shoulder pain.   F7

## 2021-05-30 NOTE — PROGRESS NOTES
Assessment/Plan:     Problem List Items Addressed This Visit     Chronic Pain    Relevant Medications    HYDROmorphone (DILAUDID) 4 MG tablet (Start on 7/27/2019)      Other Visit Diagnoses     Chronic pain syndrome    -  Primary    Chronic pain        Relevant Medications    fentaNYL (DURAGESIC) 25 mcg/hr    baclofen (LIORESAL) 10 MG tablet            No follow-ups on file.    Patient Instructions   PLAN:    Baclofen for muscle spasms 10 mg three times a day    Sign release of information for Dr. Vazquez to contact [facility] to see what previous injections you had there that helped.    May contact Dr. Cedrick Love for electromagnetic therapy that may help foot pain, back pain    Return in 8 weeks        Subjective:       58 y.o. female presents for evaluation low back, left foot, shoulder pain.  Chronic abdominal pain.  She arrived much later than her scheduled appointment that was able to be seen.    She had requested a lumbar MRI as she was having more problems with her left foot, her back, down her right leg.  EXAM: MR LUMBAR SPINE WO CONTRAST  LOCATION: [facility]  DATE/TIME: 7/2/2019 5:33 PM     INDICATION: Sciatica, and/or radiculopathy, >6wks despite conservative tx  COMPARISON: None.  TECHNIQUE: Without IV contrast     FINDINGS:   Nomenclature is based on 5 lumbar type vertebral bodies. Broad lumbar levocurvature centered at L2-L3 and measuring approximately 23 degrees in the MR scan position. Slight right lateral listhesis at L1-L2 and left lateral listhesis at L3-L4. Multilevel   Schmorl's nodes with otherwise relatively preserved vertebral body heights. Modic type one endplate edema at L3-L4 on the right. Minor Modic type II endplate signal changes at L2-L3 and L4-L5. The conus tip is identified at L1-L2. Mild posterior   paraspinal muscular atrophy. The visualized portions of the bony pelvis are normal for age.     T12-L1: Minor loss of disc height and signal. No herniation. Minimal facet  arthropathy. No spinal canal stenosis. No right neural foraminal stenosis. No left neural foraminal stenosis.     L1-L2: Disc desiccation with minor loss of disc height on the right. Tiny central disc protrusion with associated annular fissure. Minimal facet arthropathy. No spinal canal stenosis. No right neural foraminal stenosis. No left neural foraminal stenosis.     L2-L3: Mild disc desiccation moderate to advanced loss of disc height, greater on the right. Mild circumferential disc osteophyte complex eccentric right. Mild facet arthropathy. Low-grade narrowing the right lateral recess without spinal canal stenosis.   Mild right neural foraminal stenosis. No left neural foraminal stenosis.     L3-L4: Moderate to advanced loss of disc height and signal, greater on the right. Mild circumferential disc bulge asymmetric right foraminal and lateral disc osteophyte complex. Mild to moderate facet arthropathy. Mild narrowing of the lateral recesses   without central spinal canal stenosis. Minimal right neural foraminal stenosis. Minimal left neural foraminal stenosis.     L4-L5: Mild disc desiccation with mild to moderate loss of disc height, greater on the left. Shallow left foraminal and lateral disc osteophyte complex. Mild facet arthropathy. No spinal canal stenosis. No right neural foraminal stenosis. Mild left   neural foraminal stenosis.     L5-S1: Mild to moderate loss of disc height and signal. Slight annular bulge without focal disc herniation. Mild to moderate facet arthropathy. No spinal canal stenosis. No right neural foraminal stenosis. Mild left neural foraminal stenosis.     IMPRESSION:   CONCLUSION:  1.  Mild to moderate multilevel lumbar spondylosis with broad lumbar levocurvature and associated low-grade subluxations as above.  2.  Low-grade lateral recess stenosis at several levels. No high-grade central spinal canal stenosis.  3.  Mild multilevel neural foraminal stenosis as described. No  high-grade neural foraminal stenosis identified  Compared this to a study in the record from 2008.  That record did not comment on spondylolysis.  Did not comment on the lateral recess stenosis.  She describes she has seen a neurosurgeon in the past and did not think there were any neurosurgical interventions to be offered.    She recalls having some injections at neuro clinic in the past that she thought were helpful for her back.  She did not recall what they were.  She wondered if there was a gynecologic element to that.  This was before she was having all her GI problems.  She recalls something about a series of 3.  She has signed a release of information for us to get that information.    She recalls having pelvic floor therapy in the past being told very tight.    She reviews the left foot pain has been since her surgery.  She is having more spasms however on her right flank into her right back.    She has been using the fentanyl patch 25 mcg every 48 hours, and the hydromorphone 4 mg 6 times a day.  She asked about increased dose.  She has stopped taking the morphine as concern using 3 different opioids.      reviewed, as expected     we discussed instead looking into spasm agents as that is been a factor.  When seen last time the tizanidine was not helpful.  She does not recall using baclofen.    When last seen we did get some rheumatologic laboratories concern for a finger that was swollen.  Those were negative.  She demonstrates that her finger is not swollen today, has been intermittent.       Current Outpatient Medications:      buPROPion (WELLBUTRIN XL) 150 MG 24 hr tablet, Take 450 mg by mouth daily., Disp: , Rfl:      docusate sodium (COLACE) 100 MG capsule, Take 1 capsule (100 mg total) by mouth 2 (two) times a day., Disp: 60 capsule, Rfl: 2     fentaNYL (DURAGESIC) 25 mcg/hr, Place 1 patch on the skin every third day., Disp: 10 patch, Rfl: 0     [START ON 7/27/2019] HYDROmorphone (DILAUDID) 4 MG  "tablet, Take 1 tablet (4 mg total) by mouth every 4 (four) hours as needed for pain., Disp: 84 tablet, Rfl: 0     lamoTRIgine (LAMICTAL) 200 MG tablet, Take 200 mg by mouth., Disp: , Rfl:      naloxone (NARCAN) 4 mg/actuation nasal spray, 1 spray (4 mg dose) into one nostril for opioid reversal. Call 911. May repeat if no response in 3 minutes., Disp: 1 Box, Rfl: 0     QUEtiapine (SEROQUEL) 25 MG tablet, Take 25 mg by mouth at bedtime. Can take up to 3 a night, Disp: , Rfl:      traZODone (DESYREL) 300 MG tablet, TK 1 T PO QD HS, Disp: , Rfl: 2     venlafaxine (EFFEXOR-XR) 150 MG 24 hr capsule, TAKE 2 CAPSULES BY MOUTH EVERY DAY, Disp: 60 capsule, Rfl: 0     baclofen (LIORESAL) 10 MG tablet, Take 1 tablet (10 mg total) by mouth 3 (three) times a day., Disp: 45 tablet, Rfl: 2     clindamycin (CLEOCIN) 300 MG capsule, One and a half tabs (450 mg) by mouth 3 times a day for 5 days, Disp: 23 capsule, Rfl: 0     magnesium oxide (MAGOX) 400 mg (241.3 mg magnesium) tablet, One tab up to four times a day, Disp: 90 tablet, Rfl: 2     medroxyPROGESTERone (PROVERA) 10 MG tablet, Take 1 tablet by mouth daily. Once daily for 12 days out of the month, Disp: , Rfl: 0     methylnaltrexone (RELISTOR) 150 mg Tab, Take 450 mg by mouth daily., Disp: 30 tablet, Rfl: 2     naloxegol (MOVANTIK) 25 mg Tab tablet, Take 1 tablet (25 mg total) by mouth daily., Disp: 30 tablet, Rfl: 0     tiZANidine (ZANAFLEX) 4 MG tablet, One-half tab four times a day, may increase after 4 days to one four times a day, Disp: 75 tablet, Rfl: 2  She is alert with a clear sensorium good eye contact.  Thought process tight logical.  Points in upper lower extremities baseline noted.     Objective:     Vitals:    07/16/19 1430   BP: 117/65   Pulse: (!) 108   Weight: 120 lb (54.4 kg)   Height: 5' 6\" (1.676 m)   PainSc:   9   PainLoc: Back       Plan: Trial of baclofen for the muscle spasms associated with increased pain, 10 mg 3 times a day.  Possible side effects " discussed.    I will contact the [facility] to see what previous injection she had that were helpful.    Discussed resources for electromagnetic therapy that may help with the foot pain and back pain.  Trial with frequency specific microcurrent was done today.    Time spent more than 25 minutes face-to-face, more than 50% count about above condition coordination treatment plan          This note has been dictated using voice recognition software. Any grammatical or context distortions are unintentional and inherent to the software

## 2021-05-31 VITALS — BODY MASS INDEX: 19.16 KG/M2 | HEIGHT: 65 IN | WEIGHT: 115 LBS

## 2021-05-31 VITALS — HEIGHT: 65 IN | WEIGHT: 115.31 LBS | BODY MASS INDEX: 19.21 KG/M2

## 2021-05-31 VITALS — BODY MASS INDEX: 19.05 KG/M2 | WEIGHT: 118 LBS

## 2021-05-31 VITALS — HEIGHT: 65 IN | WEIGHT: 115 LBS | BODY MASS INDEX: 19.16 KG/M2

## 2021-05-31 VITALS — BODY MASS INDEX: 18.96 KG/M2 | WEIGHT: 118 LBS | HEIGHT: 66 IN

## 2021-05-31 VITALS — HEIGHT: 66 IN | WEIGHT: 120 LBS | BODY MASS INDEX: 19.29 KG/M2

## 2021-05-31 VITALS — BODY MASS INDEX: 19.29 KG/M2 | WEIGHT: 120 LBS | HEIGHT: 66 IN

## 2021-06-01 ENCOUNTER — RECORDS - HEALTHEAST (OUTPATIENT)
Dept: ADMINISTRATIVE | Facility: CLINIC | Age: 61
End: 2021-06-01

## 2021-06-01 ENCOUNTER — COMMUNICATION - HEALTHEAST (OUTPATIENT)
Dept: PALLIATIVE MEDICINE | Facility: OTHER | Age: 61
End: 2021-06-01

## 2021-06-01 VITALS — HEIGHT: 66 IN | WEIGHT: 120 LBS | BODY MASS INDEX: 19.29 KG/M2

## 2021-06-01 NOTE — PATIENT INSTRUCTIONS - HE
PLAN:    Discussed pudendal nerve block, you will consider, [facility]    Continue Fentanyl, Hydromophone as you are    Return in 8 weeks

## 2021-06-01 NOTE — PROGRESS NOTES
Assessment/Plan:     Problem List Items Addressed This Visit     Chronic Pain    Relevant Medications    HYDROmorphone (DILAUDID) 4 MG tablet      Other Visit Diagnoses     Chronic pain        Relevant Medications    fentaNYL (DURAGESIC) 25 mcg/hr (Start on 9/18/2019)            No follow-ups on file.    Patient Instructions   PLAN:    Discussed pudendal nerve block, you will consider, [facility] with [facility]    Continue Fentanyl, Hydromophone as you are    Return in 8 weeks        Subjective:       59 y.o. female presents for evaluation low back pain, abdominal pain.    Since last seen things been relatively stable.  She can sometimes get abdominal cramping from the mesh.    When climbing up stairs she notes the back pain.    When last seen we did collect information previous injections and [facility] had [facility]endal nerve blocks.  She remembers now she has series of 3.  They will to target tailbone pain.    She had done some frequency specific microcurrent treatments without any benefit.    She reviews continue routine where she goes out in the morning coffee shop and meets a friend.  She has another friend they do regular moving nights.  She has no other particular plans noted.    Her present medications include the hydromorphone 4 mg 6 times a day and the fentanyl patch 25 mcg every 3 days.  On this regimen she is having fairly comfortable control.  She is managing her constipation.    We discussed could schedule pudendal nerve blocks again, she would like to consider whether she is ready to pursue that intervention.      Current Outpatient Medications:      buPROPion (WELLBUTRIN XL) 150 MG 24 hr tablet, Take 450 mg by mouth daily., Disp: , Rfl:      clindamycin (CLEOCIN) 300 MG capsule, One and a half tabs (450 mg) by mouth 3 times a day for 5 days, Disp: 23 capsule, Rfl: 0     docusate sodium (COLACE) 100 MG capsule, Take 1 capsule (100 mg total) by mouth 2 (two) times a  "day., Disp: 60 capsule, Rfl: 2     [START ON 9/18/2019] fentaNYL (DURAGESIC) 25 mcg/hr, Place 1 patch on the skin every third day., Disp: 10 patch, Rfl: 0     HYDROmorphone (DILAUDID) 4 MG tablet, Take 1 tablet (4 mg total) by mouth every 4 (four) hours as needed for pain., Disp: 108 tablet, Rfl: 0     magnesium oxide (MAGOX) 400 mg (241.3 mg magnesium) tablet, One tab up to four times a day, Disp: 90 tablet, Rfl: 2     medroxyPROGESTERone (PROVERA) 10 MG tablet, Take 1 tablet by mouth daily. Once daily for 12 days out of the month, Disp: , Rfl: 0     methylnaltrexone (RELISTOR) 150 mg Tab, Take 450 mg by mouth daily., Disp: 30 tablet, Rfl: 2     naloxegol (MOVANTIK) 25 mg Tab tablet, Take 1 tablet (25 mg total) by mouth daily., Disp: 30 tablet, Rfl: 0     naloxone (NARCAN) 4 mg/actuation nasal spray, 1 spray (4 mg dose) into one nostril for opioid reversal. Call 911. May repeat if no response in 3 minutes., Disp: 1 Box, Rfl: 0     QUEtiapine (SEROQUEL) 25 MG tablet, Take 25 mg by mouth at bedtime. Can take up to 3 a night, Disp: , Rfl:      tiZANidine (ZANAFLEX) 4 MG tablet, One-half tab four times a day, may increase after 4 days to one four times a day, Disp: 75 tablet, Rfl: 2     traZODone (DESYREL) 300 MG tablet, TK 1 T PO QD HS, Disp: , Rfl: 2     venlafaxine (EFFEXOR-XR) 150 MG 24 hr capsule, TAKE 2 CAPSULES BY MOUTH EVERY DAY, Disp: 60 capsule, Rfl: 0  Is alert with a clear sensorium good eye contact.  Thought process tight logical.  Affect is fairly full range.              Objective:     Vitals:    09/10/19 1303   BP: 103/64   Pulse: (!) 107   Resp: 16   Weight: 120 lb (54.4 kg)   Height: 5' 6\" (1.676 m)   PainSc:   8   PainLoc: Back       Time spent more than 10 minutes face-to-face, 50% count about above condition coordination treatment plan as above          This note has been dictated using voice recognition software. Any grammatical or context distortions are unintentional and inherent to the " software

## 2021-06-02 ENCOUNTER — RECORDS - HEALTHEAST (OUTPATIENT)
Dept: ADMINISTRATIVE | Facility: CLINIC | Age: 61
End: 2021-06-02

## 2021-06-02 VITALS — BODY MASS INDEX: 19.29 KG/M2 | HEIGHT: 66 IN | WEIGHT: 120 LBS

## 2021-06-02 VITALS — BODY MASS INDEX: 19.29 KG/M2 | WEIGHT: 120 LBS | HEIGHT: 66 IN

## 2021-06-03 VITALS — WEIGHT: 120 LBS | BODY MASS INDEX: 19.29 KG/M2 | HEIGHT: 66 IN

## 2021-06-03 VITALS — HEIGHT: 66 IN | WEIGHT: 120 LBS | BODY MASS INDEX: 19.29 KG/M2

## 2021-06-03 VITALS — BODY MASS INDEX: 19.29 KG/M2 | HEIGHT: 66 IN | WEIGHT: 120 LBS

## 2021-06-03 NOTE — TELEPHONE ENCOUNTER
Medication being requested: Hydromorphone  Last visit date: 11/5/2019.  Provider: BE  Next visit date: 12/31/2019.  Provider: BE  Expected follow up: 8 weeks  [facility] visit ([facility]) date: DELFINA  UDT date: 11/2019  Agreement date: 11/2019   snipped in:    Pertinent between visit information about requested medication (telephone, mychart, prior authorization, concerns, comments): DELFINA  Script being sent to provider by nurse- dates and quantity:   Requested Prescriptions     Pending Prescriptions Disp Refills     HYDROmorphone (DILAUDID) 4 MG tablet 84 tablet 0     Sig: Take 1 tablet (4 mg total) by mouth every 4 (four) hours as needed for pain.     Pharmacy cued: [facility]  Standing orders for withdrawal protocol implemented: DELFINA

## 2021-06-03 NOTE — PROGRESS NOTES
Assessment/Plan:     Problem List Items Addressed This Visit     Drug-induced constipation    Relevant Medications    lactulose (ENULOSE) 10 gram/15 mL solution      Other Visit Diagnoses     Chronic pain syndrome    -  Primary    Relevant Orders    OPS US Guided Pudendal Nerve Block    Pain Management Drug Panel, Urine    Other specified mononeuropathies         Relevant Orders    OPS US Guided Pudendal Nerve Block            No follow-ups on file.    Patient Instructions   PLAN:    [facility] to [facility] for [facility]    Lactulose for constipation, 30 mg one to two times a day as needed    Discussed electromagnetic therapy, may contact Dr. Cedrick Love, [phone]    Discussed phototherapy lamps for Seasonal Affective Disorder,  Lamps must be 10,000 lux intensity, one source [employer], [phone]    Continue Fentanyl, Hydromorphone as you are    Return in 8 weeks            Subjective:       59 y.o. female presents for evaluation abdominal pain, back shoulder pain.    She she describes over the last week having more pain in her groin, low back and neck.  No clear precipitant.  She wonders in part whether she should proceed with the pudendal nerve blocks we discussed last time.  She learned she had some at the [facility].    She also wonders what extent is due to constipation.  She has been taking fiber bars.  She describes at night she can be incontinent of urine and stool.  There seems to be some leakage.  She been using MiraLAX with leakage.  Her insurance would not cover Movantik and Relistor at affordable cost.    She reviews not looking forward to the winter approaching.  She did consider relocating to [place].  However her father has had more problems with TIAs, unsure the long-term situation and if he passes, her mother would want to return here.    She continues to still see some of her friends.    She did not look into the frequency specific microcurrent which we discussed  "last time.    Reviewed with winter to what extent some of it is hard to get out due to pain, and what is more consistent with seasonal affective disorder and this may fit the case for her.  Reviewed the role of phototherapy labs in this regard.        Current Outpatient Medications:      buPROPion (WELLBUTRIN XL) 150 MG 24 hr tablet, Take 450 mg by mouth daily., Disp: , Rfl:      fentaNYL (DURAGESIC) 25 mcg/hr, Place 1 patch on the skin every third day., Disp: 10 patch, Rfl: 0     HYDROmorphone (DILAUDID) 4 MG tablet, Take 1 tablet (4 mg total) by mouth every 4 (four) hours as needed for pain., Disp: 84 tablet, Rfl: 0     naloxone (NARCAN) 4 mg/actuation nasal spray, 1 spray (4 mg dose) into one nostril for opioid reversal. Call 911. May repeat if no response in 3 minutes., Disp: 1 Box, Rfl: 0     QUEtiapine (SEROQUEL) 25 MG tablet, Take 25 mg by mouth at bedtime. Can take up to 3 a night, Disp: , Rfl:      tiZANidine (ZANAFLEX) 4 MG tablet, One-half tab four times a day, may increase after 4 days to one four times a day, Disp: 75 tablet, Rfl: 2     traZODone (DESYREL) 300 MG tablet, TK 1 T PO QD HS, Disp: , Rfl: 2     venlafaxine (EFFEXOR-XR) 150 MG 24 hr capsule, TAKE 2 CAPSULES BY MOUTH EVERY DAY, Disp: 60 capsule, Rfl: 0     lactulose (ENULOSE) 10 gram/15 mL solution, Take 30 mL (20 g total) by mouth 2 (two) times a day as needed., Disp: 236 mL, Rfl: 3  Clear sensorium good eye contact.  Thought process tight logical.  Affect is fairly full range.          Objective:     Vitals:    11/05/19 1305   BP: 136/65   Pulse: 99   Weight: 120 lb (54.4 kg)   Height: 5' 6\" (1.676 m)   PainSc:   8   PainLoc: Back         Impression: Possible overflow incontinence, with opioid-induced constipation, insurance is not covered such agents.  Will review some other approaches including the use of lactulose.    We will put in referral to pudendal nerve blocks through provider to the spine center.    Resources were given for " phototherapy lamp.    We will continue with the fentanyl 25 mcg patch, hydromorphone 4 mg every 6 hours, this regimen is provided some relief, without respiratory compromise she experiences a higher doses of the fentanyl.    Time spent more than 15 minutes face-to-face, 50% count about above condition coordination treatment plan as above          This note has been dictated using voice recognition software. Any grammatical or context distortions are unintentional and inherent to the software

## 2021-06-03 NOTE — PATIENT INSTRUCTIONS - HE
PLAN:    [facility]erral to [facility] for [facility] blocks    Lactulose for constipation, 30 mg one to two times a day as needed    Discussed electromagnetic therapy, may contact Dr. Cedrick Love, [phone]    Discussed phototherapy lamps for Seasonal Affective Disorder,  Lamps must be 10,000 lux intensity, one source [employer], [phone]    Continue Fentanyl, Hydromorphone as you are    Return in 8 weeks

## 2021-06-03 NOTE — PROGRESS NOTES
Pt is being seen today by Tyler Vazquez MD, for refills, UDT/CSA and f/u of 8-constant, achy, stabbing, burning, sharp back, rt shoulder, neck, groin and bilat foot pain.   F6

## 2021-06-04 VITALS — BODY MASS INDEX: 21.63 KG/M2 | HEIGHT: 66 IN

## 2021-06-04 VITALS — HEIGHT: 66 IN | BODY MASS INDEX: 21.63 KG/M2

## 2021-06-04 VITALS — BODY MASS INDEX: 21.53 KG/M2 | HEIGHT: 66 IN | WEIGHT: 134 LBS

## 2021-06-04 NOTE — PATIENT INSTRUCTIONS - HE
PLAN:    Discussed Phototherapy lamp for Seasonal Affective disorder, may obtain less expensive lamp still functional    Discussed electromagnetic therapy, may contact Dr. Cedrick Love [phone]    Return in 8 weeks

## 2021-06-04 NOTE — PROGRESS NOTES
"Assessment/Plan:     Problem List Items Addressed This Visit     Midback Pain    Relevant Medications    fentaNYL (DURAGESIC) 25 mcg/hr    Chronic Pain    Relevant Medications    HYDROmorphone (DILAUDID) 4 MG tablet            No follow-ups on file.    Patient Instructions   PLAN:    Discussed Phototherapy lamp for Seasonal Affective disorder, may obtain less expensive lamp still functional    Discussed electromagnetic therapy, may contact Dr. Cedrick Love [phone]    Return in 8 weeks        Subjective:       59 y.o. female presents for back pain, pelvic floor pain.    Describes continues of time where pain can be shooting down her right leg or left leg, sometimes not able to cross her legs.  Appears better more recently.    Reviews the holidays were stressful.    She exchanged messages with the spine center about pudendal nerve blocks then \"forgot\".  She knowledges is anxious about the procedure.    She continues at times with loose stools and some leaking.  She did not try the lactulose as she is worried particular about nighttime incontinence.    She did not look into the phototherapy lamp contact Dr. Ross as yet.  She is concerned about the cost of the labs.  Reviewed my awareness of some less expensive lamps that may be as effective closer to $30.      Current Outpatient Medications:      buPROPion (WELLBUTRIN XL) 150 MG 24 hr tablet, Take 450 mg by mouth daily., Disp: , Rfl:      fentaNYL (DURAGESIC) 25 mcg/hr, Place 1 patch on the skin every third day., Disp: 10 patch, Rfl: 0     HYDROmorphone (DILAUDID) 4 MG tablet, Take 1 tablet (4 mg total) by mouth every 4 (four) hours as needed for pain., Disp: 84 tablet, Rfl: 0     naloxone (NARCAN) 4 mg/actuation nasal spray, 1 spray (4 mg dose) into one nostril for opioid reversal. Call 911. May repeat if no response in 3 minutes., Disp: 1 Box, Rfl: 0     QUEtiapine (SEROQUEL) 25 MG tablet, Take 25 mg by mouth at bedtime. Can take up to 3 a night, Disp: , Rfl: " "     traZODone (DESYREL) 300 MG tablet, TK 1 T PO QD HS, Disp: , Rfl: 2     venlafaxine (EFFEXOR-XR) 150 MG 24 hr capsule, TAKE 2 CAPSULES BY MOUTH EVERY DAY, Disp: 60 capsule, Rfl: 0     lactulose (ENULOSE) 10 gram/15 mL solution, Take 30 mL (20 g total) by mouth 2 (two) times a day as needed., Disp: 236 mL, Rfl: 3     tiZANidine (ZANAFLEX) 4 MG tablet, One-half tab four times a day, may increase after 4 days to one four times a day, Disp: 75 tablet, Rfl: 2  She is alert with a clear sensorium good eye contact.  Anxious affect.  Tremor as previously noted.    Continues on the fentanyl 25 mcg every 3 days and the hydromorphone 4 mg times a day.         Objective:     Vitals:    12/31/19 1325   BP: 122/59   Pulse: 99   Weight: 120 lb (54.4 kg)   Height: 5' 6\" (1.676 m)   PainSc:   8   PainLoc: Back     Plan: Discussed resources for a phototherapy lamp relative less expensive.    Reviewed the role of electromagnetic therapy as an intervention rather than anesthetic interventions for her pelvic floor complaints.    Time spent more than 15 minutes face-to-face, 50% counseling about above condition and coordination of treatment plan          This note has been dictated using voice recognition software. Any grammatical or context distortions are unintentional and inherent to the software  "

## 2021-06-04 NOTE — TELEPHONE ENCOUNTER
Medication being requested: fentanyl  Last visit date: 11/5.  Provider: BE  Next visit date: 12/31.  Provider: BE  Expected follow up: 8 weeks  MTM visit ([facility]) date: no  UDT date: 11/19  Agreement date: 11/19   snipped in:  11/20/2019 1 11/20/2019 Fentanyl 25 Mcg/hr Patch   10.00 30 Br [facility] [id number] [facility] (5605) 0/0 60.00 MME Comm Ins     Pertinent between visit information about requested medication (telephone, mychart, prior authorization, concerns, comments): none  Script being sent to provider by nurse- dates and quantity:   Requested Prescriptions     Pending Prescriptions Disp Refills     fentaNYL (DURAGESIC) 25 mcg/hr 10 patch 0     Sig: Place 1 patch on the skin every third day.     Pharmacy cued: [facility]  Standing orders for withdrawal protocol implemented: n/a

## 2021-06-04 NOTE — PROGRESS NOTES
Patient here to see Dr. Vazquze for a follow-up visit. Patient complains of pain in lower back that radiates to buttocks, shoulders and left foot.  Patients describes pain constant ache with shooting, stabbing pain with a level of 8/10. Functionality score: 8

## 2021-06-05 VITALS — HEIGHT: 66 IN | BODY MASS INDEX: 21.63 KG/M2

## 2021-06-05 VITALS — BODY MASS INDEX: 21.63 KG/M2 | HEIGHT: 66 IN

## 2021-06-06 NOTE — TELEPHONE ENCOUNTER
Patient calls, voicemail to inform the provider that she has increased anxiety due to threats of corona virus.  Patient mentions this may keep her from getting to physical therapy and she is also following up about her recent bloodwork.  She was able to see the results and noticed her vitamin D to be below normal.  24.8, normal range of 30-80  Does not appear she is currently taking a vitamin d supplement but may benefit from this.

## 2021-06-06 NOTE — PATIENT INSTRUCTIONS - HE
PLAN:    Labs today    Referral to [facility] to assess foot problems after bladder surgery    May schedule with Dr. Vazquez for Frequency Specific Microcurrent on Wednesdays as available    Continue Fentanyl, Hydromorphone as you are    Call if going on vacation to coordinate meds    Return in 8 weeks

## 2021-06-06 NOTE — PROGRESS NOTES
Assessment/Plan:     Problem List Items Addressed This Visit     Chronic Pain    Relevant Medications    HYDROmorphone (DILAUDID) 4 MG tablet      Other Visit Diagnoses     Chronic pain syndrome    -  Primary    Relevant Orders    Glycosylated Hemoglobin A1C (Completed)    Vitamin D, Total (25-Hydroxy) (Completed)    Long term (current) use of opiate analgesic         Relevant Orders    Glycosylated Hemoglobin A1C (Completed)    Vitamin D deficiency, unspecified         Relevant Orders    Vitamin D, Total (25-Hydroxy) (Completed)              No follow-ups on file.    Patient Instructions   PLAN:    Labs today    Referral to [facility] to assess foot problems after bladder surgery    May schedule with Dr. Vazquez for Frequency Specific Microcurrent on Wednesdays as available    Continue Fentanyl, Hydromorphone as you are    Call if going on vacation to coordinate meds    Return in 8 weeks            Subjective:       59 y.o. female presents for management of low back pain, pelvic floor pain.    Reviews her left fourth toe have had increased pain more recently.  Has been an ongoing issue since her 2009 bladder surgery where she was told there was a nerve that was nicked.  Tries to keep active, can walk 30 minutes.  In 2010 she did have some surgery on the left foot which she thinks helped.    Her left leg can have numbness also since the surgery.    She has been increasingly worried about the coronavirus, was planning to go to [place] see family that was concerned if she will travel then.    She has had a 20 pound weight gain, has not change the way that she eats.  She has been using Seroquel for anxiety for many years, does not necessarily associated with that.  I did not see she has had a hemoglobin A1c in a while.    She has been using a friend's phototherapy lamp for seasonal affective disorder, has not been particularly helpful.        She continues with the hydromorphone 4 mg 3 times a day and the  "fentanyl patch 10 mcg every 3 days.  This regimen she has not had respiratory distress.    No changes with her uro-gynecological symptoms and the plan.      Current Outpatient Medications:      buPROPion (WELLBUTRIN XL) 150 MG 24 hr tablet, Take 450 mg by mouth daily., Disp: , Rfl:      fentaNYL (DURAGESIC) 25 mcg/hr, Place 1 patch on the skin every third day., Disp: 10 patch, Rfl: 0     lamoTRIgine (LAMICTAL) 200 MG tablet, Take 1 tablet by mouth daily., Disp: , Rfl:      naloxone (NARCAN) 4 mg/actuation nasal spray, 1 spray (4 mg dose) into one nostril for opioid reversal. Call 911. May repeat if no response in 3 minutes., Disp: 1 Box, Rfl: 0     QUEtiapine (SEROQUEL) 25 MG tablet, Take 25 mg by mouth at bedtime. Can take up to 3 a night, Disp: , Rfl:      traZODone (DESYREL) 300 MG tablet, TK 1 T PO QD HS, Disp: , Rfl: 2     venlafaxine (EFFEXOR-XR) 150 MG 24 hr capsule, TAKE 2 CAPSULES BY MOUTH EVERY DAY, Disp: 60 capsule, Rfl: 0     cholecalciferol, vitamin D3, 2,000 unit Chew, Chew 1 capsule daily for 7 days, THEN 2 capsules daily. For one week, then two daily., Disp: 60 tablet, Rfl: 10     HYDROmorphone (DILAUDID) 4 MG tablet, Take 1 tablet (4 mg total) by mouth every 4 (four) hours as needed for pain., Disp: 84 tablet, Rfl: 0      mg tablet, , Disp: , Rfl: 0     lactulose (ENULOSE) 10 gram/15 mL solution, Take 30 mL (20 g total) by mouth 2 (two) times a day as needed., Disp: 236 mL, Rfl: 3     tiZANidine (ZANAFLEX) 4 MG tablet, One-half tab four times a day, may increase after 4 days to one four times a day, Disp: 75 tablet, Rfl: 2  She is alert with a clear sensorium good eye contact.  Anxious affect.    Left third and fourth hole are indeed erythematous, do appear somewhat contracted.              Objective:     Vitals:    03/04/20 1258   BP: 120/63   Pulse: 96   Weight: 134 lb (60.8 kg)   Height: 5' 6\" (1.676 m)   PainSc:   9   PainLoc: Foot         Plan, discussed [facility], for a postural restoration approach to see whether there is a neuromuscular element to her present foot complaints that can be addressed since her surgery.    Reviewed the role of frequency specific microcurrent.    We will obtain laboratories including a globin A1c with concerns for weight gain from the Seroquel.    Time spent more than 25 minutes face-to-face, 50% counts about above conditions coordination treatment plan          This note has been dictated using voice recognition software. Any grammatical or context distortions are unintentional and inherent to the software

## 2021-06-06 NOTE — PROGRESS NOTES
Patient is here for a follow up appointment with Dr. Vazquez. Patient has left foot, low back and right pinky pain, describes the pain as constant, sharp, pounding and aching,rates the pain as +9/10. Patient needs refills for Dilaudid. Completed JOHN. Functionality is 8. Patient is leaving for vacation to [place] on 4-23 through 5-12.

## 2021-06-07 NOTE — TELEPHONE ENCOUNTER
Refill request: hydromorphone 4 mg and fentanyl 25 mcg patch.  No significant changes since last template completed on 20  Requested Prescriptions     Pending Prescriptions Disp Refills     HYDROmorphone (DILAUDID) 4 MG tablet 84 tablet 0     Sig: Take 1 tablet (4 mg total) by mouth every 4 (four) hours as needed for pain.     fentaNYL (DURAGESIC) 25 mcg/hr 10 patch 0     Sig: Place 1 patch on the skin every third day.     Signed Prescriptions Disp Refills     HYDROmorphone (DILAUDID) 4 MG tablet 84 tablet 0     Sig: Take 1 tablet (4 mg total) by mouth every 4 (four) hours as needed for pain.     Authorizing Provider: JOSE HOPPER     venlafaxine (EFFEXOR-XR) 150 MG 24 hr capsule 60 capsule 0     Sig: Take 2 capsules (300 mg total) by mouth daily.     Authorizing Provider: JOSE HOPPER     cholecalciferol, vitamin D3, 2,000 unit Chew 60 tablet 10     Sig: Chew 1 capsule daily for 7 days, THEN 2 capsules daily. For one week, then two daily.     Authorizing Provider: JOSE HOPPER     naloxone (NARCAN) 4 mg/actuation nasal spray 1 Box 0     Si spray (4 mg dose) into one nostril for opioid reversal. Call 911. May repeat if no response in 3 minutes.     Authorizing Provider: JOSE HOPPER     lamoTRIgine (LAMICTAL) 200 MG tablet 30 tablet 2     Sig: Take 1 tablet (200 mg total) by mouth daily.     Authorizing Provider: JOSE HOPPER

## 2021-06-08 NOTE — PROGRESS NOTES
"Assessment/Plan:     Problem List Items Addressed This Visit     Chronic Pain              No follow-ups on file.    There are no Patient Instructions on file for this visit.      Subjective:       59 y.o. female The patient has been notified of the following:      \"We have found that certain health care needs can be provided without the need for a face to face visit.  This service lets us provide the care you need with a phone conversation.       I will have full access to your [facility] medical record during this entire phone call.   I will be taking notes for your medical record.      Since this is like an office visit, we will bill your insurance company for this service.       There are potential benefits and risks of telephone visits (e.g. limits to patient confidentiality) that differ from in-person visits.?  Confidentiality still applies for telephone services, and nobody will record the visit.  It is important to be in a quiet, private space that is free of distractions (including cell phone or other devices) during the visit.??      If during the course of the call I believe a telephone visit is not appropriate, you will not be charged for this service\"     Consent has been obtained for this service by care team member: Yes      About the same.  She continues with concern for her foot, she had dropped something on weeks ago.    She had gotten a call from [facility].  She is not clear who needs to return the call.  She is \"too scared\" to go out due to the coronavirus at this time.    Reviewed laboratories, vitamin D level returned low at 24 and she is taking supplementation.  Hemoglobin A1c is 5.4.  She notes the weight gain seems to have leveled out.  Unclear what that related to.    She describes with her fentanyl patches 1 patch fell off after 1 day 2:01 days.  She is used Tegaderm in the past, and now it is expensive.  She notes it is not usually an issue.  She does feel like she is gotten " sweaty from the medications.  It may be more noticeable from the fentanyl with a higher dose.  However is not always consistent.  We discussed with the fentanyl possibly changing to another opioid though she not wish to make any changes at this time.    She continues with the hydro-more phone 4 mg tablets taking 6 tablets in a day usually 2 at bedtime that help her sleep.    Reviews we had discussed the frequency specific microcurrent.  Again she has not comfortable coming out of this time due to the coronavirus.     is reviewed as expected.    Current Outpatient Medications:      buPROPion (WELLBUTRIN XL) 150 MG 24 hr tablet, Take 450 mg by mouth daily., Disp: , Rfl:      [START ON 5/27/2020] fentaNYL (DURAGESIC) 25 mcg/hr, Place 1 patch on the skin every third day., Disp: 10 patch, Rfl: 0     [START ON 5/22/2020] HYDROmorphone (DILAUDID) 4 MG tablet, Take 1 tablet (4 mg total) by mouth every 4 (four) hours as needed for pain., Disp: 84 tablet, Rfl: 0     lamoTRIgine (LAMICTAL) 200 MG tablet, Take 1 tablet (200 mg total) by mouth daily., Disp: 30 tablet, Rfl: 2     naloxone (NARCAN) 4 mg/actuation nasal spray, 1 spray (4 mg dose) into one nostril for opioid reversal. Call 911. May repeat if no response in 3 minutes., Disp: 1 Box, Rfl: 0     QUEtiapine (SEROQUEL) 25 MG tablet, Take 25 mg by mouth at bedtime. Can take up to 3 a night, Disp: , Rfl:      traZODone (DESYREL) 300 MG tablet, TK 1 T PO QD HS, Disp: , Rfl: 2     venlafaxine (EFFEXOR-XR) 150 MG 24 hr capsule, Take 2 capsules (300 mg total) by mouth daily., Disp: 60 capsule, Rfl: 0     VITAMIN D3 50 mcg (2,000 unit) tablet, Take 1 tablet by mouth daily., Disp: , Rfl:      hydrOXYzine pamoate (VISTARIL) 25 MG capsule, Take 1 capsule (25 mg total) by mouth 3 (three) times a day as needed for anxiety (opiate withdrawal)., Disp: 42 capsule, Rfl: 0      mg tablet, Take 800 mg by mouth every 8 (eight) hours as needed. , Disp: , Rfl: 0     lactulose  "(ENULOSE) 10 gram/15 mL solution, Take 30 mL (20 g total) by mouth 2 (two) times a day as needed., Disp: 236 mL, Rfl: 3     tiZANidine (ZANAFLEX) 4 MG tablet, One-half tab four times a day, may increase after 4 days to one four times a day, Disp: 75 tablet, Rfl: 2  Telephone is alert clear sensorium good eye contact.  Thought process tight logical.  Rather anxious affect.    Impression: Chronic pain relates to cervical and lumbar spondylolysis.  She has been maintained on opioids with some benefit.    There is been no change in her underlying urogynecologic concern.    Plan: We will continue the opioids in this fashion.  When she is more comfortable due to the coronavirus she may go to [facility] and come into the office for can see specific microcurrent.      Time spent 8 minute               Objective:     Vitals:    05/19/20 1306   Height: 5' 6\" (1.676 m)   PainSc:   5   PainLoc: Back                   This note has been dictated using voice recognition software. Any grammatical or context distortions are unintentional and inherent to the software  "

## 2021-06-08 NOTE — TELEPHONE ENCOUNTER
Patient calls, voicemail about a positive test for COVID19 with an employee at the pharmacy.  Patient would prefer not to fill her medications at this pharmacy and reports that the pharmacy is actually closed.  Either way she is not comfortable going there to fill medications.  Call placed to pharmacy: they have refills ready for patient and there was no positive test for COVID19, the test recently done came back negative.  Patient will be notified.

## 2021-06-08 NOTE — TELEPHONE ENCOUNTER
Refill request: hydromorphone 4 mg  Last ov with BE: 3/4, recommendations to continue fentanyl and hydromorphone as prescribed and to follow up in 8 weeks.    3/5: telephone encounter regarding increased anxiety surrounding COVID19- sent to provider    Next ov with BE: 5/19     Patient also reports two fentanyl patches falling off and will likely be out of this earlier than expected.  Ordered tizanidine and vistaril at this time in the event that patient experiences opiate withdrawal.    Requested Prescriptions     Pending Prescriptions Disp Refills     HYDROmorphone (DILAUDID) 4 MG tablet 67 tablet 0     Sig: Take 1 tablet (4 mg total) by mouth every 4 (four) hours as needed for pain.     tiZANidine (ZANAFLEX) 4 MG tablet 75 tablet 2     Sig: One-half tab four times a day, may increase after 4 days to one four times a day     hydrOXYzine pamoate (VISTARIL) 25 MG capsule 42 capsule 0     Sig: Take 1 capsule (25 mg total) by mouth 3 (three) times a day as needed for anxiety (opiate withdrawal).     zac

## 2021-06-08 NOTE — TELEPHONE ENCOUNTER
Completed 6/9-6/23 - chronic pain  Requested Prescriptions     Signed Prescriptions Disp Refills     HYDROmorphone (DILAUDID) 4 MG tablet 84 tablet 0     Sig: Take 1 tablet (4 mg total) by mouth every 4 (four) hours as needed for pain.     Authorizing Provider: AIDAN BROOKS     Appears appropriate per the last visit with Dr. Vazquez and review of the nurse note

## 2021-06-08 NOTE — PATIENT INSTRUCTIONS - HE
PLAN:   Tinea with the fentanyl 25 mcg patch every 3 days.  Discussed may use Tegaderm if needed when they are slippery and come off.    Continue the hydromorphone 4 mg tablets, 6 tablets in 24 hours.    Discussed we are comfortable due to the coronavirus concerns, may go [facility], or come to the office or frequency specific microcurrent.    Follow-up with Dr. Vazquez in 8 weeks

## 2021-06-08 NOTE — PROGRESS NOTES
Subjective:   Lisa Burr is a 56 y.o. female who presents for evaluation of pain. Patient was last seen 11/28/2016.      Major issues:  1. Chronic pain syndrome    2. Lumbar Disc Degeneration    3. Cervical Spondylosis    4. Pelvic Pain    5. Sciatica of left side    6. Midback Pain    7. Chronic Pain    8. Opioid Dependence With Continuous Use        CC: Pain  See rooming evaluation    HPI:   Impact of pain treatments:   Analgesia: good   ADL's: Work: disability. Household: has help from her kids.  Social: lives alone, has 4 kids, 2 lives out of town.   AE's: SOB on high doses of fentanyl, sweats, GI symptoms.   Aberrant behavior: none    Aggravating factors: cleaning, lifting, being on her feet too long  Alleviating factors: medications  Associated symptoms: none  DME: none  Location/Laterality of the pain: back pain, neck pain, shoulder pain, foot pain  Timing:  constant  Quality: shooting, achy, tight, pinching,  Severity:7/10    Activities Impaired by Increasing Pain Severity: F= 8  3-Enjoy  4-Work, Enjoy  5-Active, Mood Work Enjoy  6-Sleep, Active, Mood Work Enjoy  7-Walk, Sleep, Active, Mood Work Enjoy  8-Relate, Walk, Sleep, Active, Mood Work Enjoy      Medication: Patient is taking Fentanyl 50 mcgs every 3 days.     Last opioid dose wasFentanyl changed 2/8 .       Interventional: has tried injections and failed    Rehabilitation: has tried and failed    Integrative Approaches: chiropractor did not offer pain relief, has tried acupuncture, it did not offer relief.    Mental Health: Has a psychiatry that he follows up with, Dr.Tom Dye    Records: Reviewed to prepare for today's visits    Diagnostics:   Lab:  Last UDS/SWAB on 8/26/2016 was reviewed and was not appropriate.      Review of Systems pblm pert  Constitutional- + sleep disturbances, + activity intolerance  Musculoskeletal- + pain  Neuro- - cognitive changes, + radicular, + neuropathic symptoms  GI/-  + constipation, - urinary  "difficulty  Psych-  - mood disorders,  - taking medication in a fashion other than prescribed    Objective:     Vitals:    02/10/17 1347   BP: 131/75   Pulse: 91   Resp: 16   Weight: 115 lb (52.2 kg)   Height: 5' 6\" (1.676 m)   PainSc:   7       Constitutional:  Pleasant and cooperative female who presents alone today.   Psychiatric: Mood and affect are appropriate for the situation, setting and topic of discussion.  Patient does not appear sedated.  Integumentary:  Observed skin WNL  HEENT: EOM's grossly intact.    Chest: Breathing is non-labored.   Neurological:  Alert and oriented in all spheres including: time, place, person and situation.  Durable Medical Equipment: none    Assessment:   Lisa Burr is a 56 y.o. female seen in clinic today for back pain, neck pain, shoulder pain, foot pain.  She reports the pain as tight, sharp and stinging.  Currently she is tappering off fentanyl down to 50 mcgs every three days from 75 mcgs.  She reports increased ain here and there but she is able to manage.  Due to the disctancea fter we moved, she is looking into switching pain clinics closer to home but she will let us know once she find someone to take over prescribing her narcotics.      Plan:   Plan/NextSteps:     Medication:   Medication prescribed today Fentanyl patch 50 mcgs to use from 2/11/2017 to 3/13/2017, next refill 3/13/2017 to use 3/14 to 4/12/2017.  Order submitted.    REFILL INSTRUCTIONS:  Please contact the clinic refill line 7 days before your refill is due. Speak clearly; note cell phones cut in-and-out and poor quality speech and reception issues will influence our ability to hear you and be efficient with your prescription.     Call [phone] leave:   Your name (first and last w/ spelling)   Date of birth  Name of all the medication(s) being requested  Dose of the medication(s)   How you are taking the medication (eg. twice per day etc).     Contact your pharmacy 3 (three) days after leaving " your message to see if your prescription has been received. Please request the pharmacy check your profile to be certain about any concerns with a script failing to be received. Note: [facility] updates have been inconsistent.  If the script has not been received there may have been a problem with the communication please reach back out to the clinic.     Rehabilitation: Will refer her to [facility] for myofascial release.    Mental Health: Has a psychiatrist that she follows up with.     Health Maintenance: per primary care    Diagnostics: UDS/SWAB collected 8/26/2017   UDS/SWAB:  Patient required a random Urine Drug Screen, due to the need to comply with [place] [employer] Policy Guidelines for the use of any controlled substances. This is to ensure that patient is compliant with treatment, and to diminish diversion, abuse, or any other aberrant behaviors. Patient is either being considered for or taking a controlled substance. Unexpected findings will be discussed and treatment decision may be adjusted.       Records: Reviewed to assist with preparation for the office visit and are reflected throughout the note.    Follow up: 8 weeks      Education: Please call Monday-Friday for problems or questions and one of the clinical support staff (CSS) will help to get things figured out. The number is [phone]. Some folks are using [employer] to send and e-mail. Please remember some issues require an office visit.     Reviewed the plan of care, provided justification and answered questions with the patient.     SAFETY REMINDERS  No alcohol while taking controlled substances. Alcohol is not an illegal substance, it is unsafe to use in combination. It is a build up of substances in the body that can be extremely hazardous and may cause respirations to slow to a dangerous rate resulting in hospitalization, brain damage, or death.    Opioid medications have been associated with sharp rise in unintentional overdose and death.  Overdose  is a condition characterized by the consumption in excess of a particular drug causing adverse effects. Symptoms of overdose include: breathing slow and shallow, erratic or not at all, pinpoint pupils, hallucinations, confusion, muscle jerks, slack muscles, extreme sleepiness or loss of alertness, awake but not able to talk, face pale or clammy, vomiting, for lighter skinned people, the skin tone turns bluish purple, for darker skinned people, it turns grayish or ashen. If in a situation where overdose is a concern engage the emergency response system (dial 911).    Do not sell, loan, borrow or share your opioid medication with anyone. Deaths have occurred as a result of this practice. It is illegal and patients are being prosecuted.       *Universal Precautions:   UDS/Swab- 8/26/2017   Consent-   Agreement- 5/3/2016  Pharmacy- as documented   - 2/10/2017  Count- n/a  Psychological evaluation n/a  Pharmacogenetic testing- n/a  MME- 120    Management of opioid medication is inherently a moderate to high complex medial interaction based on the risk management required at each contact r/t risks and side effects.    Patient Arrived @ 1324 for a 1340 appointment.     TT: 40 - min  CT: over half spent in education and counseling as outlined in the plan.      Rosario DE LEON FNP-C  [address] [place] [address]  [facility]  [phone]  [phone]

## 2021-06-09 ENCOUNTER — COMMUNICATION - HEALTHEAST (OUTPATIENT)
Dept: PALLIATIVE MEDICINE | Facility: OTHER | Age: 61
End: 2021-06-09

## 2021-06-09 NOTE — PATIENT INSTRUCTIONS - HE
PLAN:  Continue the fentanyl 25 mcg patch every 3 days.    Continue hydromorphone 4 mg 4-6 times a day.    Discussed he will look into physical therapy would feel more comfortable with coronavirus.    Follow-up with Dr. Vazquez in 8 to 12 weeks.

## 2021-06-09 NOTE — PROGRESS NOTES
"Assessment/Plan:     Problem List Items Addressed This Visit     Midback Pain    Chronic Pain              No follow-ups on file.    There are no Patient Instructions on file for this visit.      Subjective:       59 y.o. female  The patient has been notified of the following:      \"We have found that certain health care needs can be provided without the need for a face to face visit.  This service lets us provide the care you need with a phone conversation.       I will have full access to your [facility] medical record during this entire phone call.   I will be taking notes for your medical record.      Since this is like an office visit, we will bill your insurance company for this service.       There are potential benefits and risks of telephone visits (e.g. limits to patient confidentiality) that differ from in-person visits.?  Confidentiality still applies for telephone services, and nobody will record the visit.  It is important to be in a quiet, private space that is free of distractions (including cell phone or other devices) during the visit.??      If during the course of the call I believe a telephone visit is not appropriate, you will not be charged for this service\"     Consent has been obtained for this service by care team member: Yes       Present attempted with an well on the oximetry, was not successful.    By telephone she describes she is doing okay\".  She has pain that impairing her low back, foot and shoulder.    No particular change in her medical condition.  She continues to have some urogynecology concerns with spotting, but they are not changed.  She has not had any further contact with [facility].    Her parents are in [place], they live in a senior living community.  They are doing okay presently though she does worry.  They want her to go down there for her 60th birthday in August but she is very cautious.    She continues using the fentanyl patch 25 mcg every 3 days.  Use the hydrocodone " 4 mg 4-6 times a day depending upon flareups.  Minimal side effects.  This is helped with reasonable control.   reviewed, as expected    She has not gone out to physical therapy other visits, concern for wound status of the coronavirus.      Current Outpatient Medications:      buPROPion (WELLBUTRIN XL) 150 MG 24 hr tablet, Take 450 mg by mouth daily., Disp: , Rfl:      fentaNYL (DURAGESIC) 25 mcg/hr, Place 1 patch on the skin every third day., Disp: 10 patch, Rfl: 0     HYDROmorphone (DILAUDID) 4 MG tablet, Take 1 tablet (4 mg total) by mouth every 4 (four) hours as needed for pain., Disp: 84 tablet, Rfl: 0     lamoTRIgine (LAMICTAL) 200 MG tablet, Take 1 tablet (200 mg total) by mouth daily., Disp: 30 tablet, Rfl: 2     naloxone (NARCAN) 4 mg/actuation nasal spray, 1 spray (4 mg dose) into one nostril for opioid reversal. Call 911. May repeat if no response in 3 minutes., Disp: 1 Box, Rfl: 0     QUEtiapine (SEROQUEL) 25 MG tablet, Take 25 mg by mouth at bedtime. Can take up to 3 a night, Disp: , Rfl:      traZODone (DESYREL) 300 MG tablet, TK 1 T PO QD HS, Disp: , Rfl: 2     venlafaxine (EFFEXOR-XR) 150 MG 24 hr capsule, Take 2 capsules (300 mg total) by mouth daily., Disp: 60 capsule, Rfl: 0     VITAMIN D3 50 mcg (2,000 unit) tablet, Take 1 tablet by mouth daily., Disp: , Rfl:      tiZANidine (ZANAFLEX) 4 MG tablet, One-half tab four times a day, may increase after 4 days to one four times a day, Disp: 75 tablet, Rfl: 2  Telephone sounds alert, clear sensorium.  Somewhat anxious affect.      Pression: Chronic pain is included lumbar and cervical degenerative changes, history of urogynecologic surgery and complications.    She has been maintained on this opioid regimen with fair control.  There is been caution with pulmonary pressure higher doses of fentanyl particular.    Plan: Continue the opioids in this fashion.    When she feels more comfortable will look into physical therapy.    Time spent more than 8  "minutes.             Objective:     Vitals:    07/15/20 1332   Height: 5' 6\" (1.676 m)   PainSc:   8   PainLoc: Back               This note has been dictated using voice recognition software. Any grammatical or context distortions are unintentional and inherent to the software  "

## 2021-06-10 NOTE — PROGRESS NOTES
"    DATE OF SERVICE: 04/07/2017    Lisa reynoso continues with some pain in her neck.  She would like to decrease to  37 mcg of the fentanyl.  She does feel that there have been less problems with  shortness of breath and less swelling though does somewhat worry about the pain.   She has also had some hair loss.    Reviewed that she had not tried the curcumin which we had discussed, though has  gotten from her daughter-in-law some herbs.  We discussed some things to review.   She recalls she had tried acupuncture previously.  When last seen, we discussed  using agents such as amantadine or Namenda.  She would like to look into this.    We discussed the use of medical cannabis and as she notes she is \"in recovery\" she  does not want to consider that.  She would like to continue, see if she is worried  about cognitive effects to what extent is related to the medications, also notes a  family history of dementia.    Blood pressure 130/71, pulse 98, pain score 7.  She is alert with a clear sensorium.   Good eye contact.  Thought process tight and logical.  Appropriately groomed.    IMPRESSION:  Chronic pain with lumbar pain, with the left radiculopathy, thought  related neurosurgery, right myofascial pain.    We have been decreasing the opioids due to concerns from the patient and family  about pulmonary function.  She indicates she would like to attempt another decrease  at this time.      PLAN:  We discussed decreasing to fentanyl 25 mcg and 12 mcg patches every 3 days.   I initially suggested for 2 weeks and if she is ready to continue, stop the patch at  that time.  She felt that she would like to try for 1 month before considering  tapering.    We discussed the use of amantadine to augment the fentanyl 100 mcg in the morning  for 4 days, then 1 morning and noon to see if this helps with radicular pain as  well.    I would like to obtain a vitamin D level.    She will review the herbal anti-inflammatory supplements " from her daughter-in-law  and may use this to augment as well.    She will follow up in several weeks for med check.    Time spent more than 15 minutes face-to-face, more than 50% counseling about above  condition and coordination of treatment plan.      MD sanaz MCCURDY 04/13/2017 21:12:27  T 04/14/2017 03:32:24  R 04/14/2017 03:32:24  [id number]        cc: JOSE SILVA DO

## 2021-06-10 NOTE — PROGRESS NOTES
DATE OF SERVICE: 05/19/2017    Lisa reviews having increased pain more recently.  She is helping her parents in  moving as they are downsizing their house.  Increased pain.  She is not sleeping  well.    She notes her psychiatrist added quetiapine which does seem to help with sleep, has  always needed trazodone.    She reviews with a trial of amantadine, she did not tolerate that having marked dry  mouth.  She has had other medications.    She is not taking any of the herbal anti-inflammatory.    She reviews with the decreasing of the fentanyl she has noticed she just does not  have as much of a breathing problem, is not sweating as much.    Reviewed her goal is to continue to try to decrease the fentanyl though as she  continues with 25 and 12 mcg patch she is not ready to decrease just yet.    Blood pressure 111/68, pulse 104, weight 115.  Pain score 8.  She is alert, clear  sensorium, appropriately groomed.  She does appear somewhat fragile and moving  slowly compared to when last seen.    IMPRESSION:  Chronic pain relates to low back pain with a left chronic  radiculopathy, and right-sided myofascial pain.    She has noted some improvements in respiratory symptoms that she and her family had  noted previously.  She expresses her desire to continue to decrease those does feel  more comfortable at this time.      PLAN:  Discussed the role of herbal anti-inflammatory supplements such as high-dose  curcumin.    Reviewed use of palmitoylethanolamide.  She knows that she is in recovery as we  discussed the use of medical cannabis.  She noted oral Percocet was the agent she  had difficulties with.  Does not feel she has any concerns with the fentanyl and  just wishes to be clear for any further discussion.    She is concerned that with the Seroquel, does not wish to use long-term.  We  discussed the use of tizanidine to help her sleep.  Reviews its benefits and side  effects, may repeat after 4 hours.  If helpful  she may wish to discontinue the  trazodone and Seroquel.  Reviewed lower doses during the day may help with pain and  withdrawal symptoms.      Will continue with the fentanyl 25 mcg and 12 mcg patches, she will decrease to 12  mcg patch when she feels more comfortable and will follow up in several weeks for  med check.    Time spent 25 minutes face to face, more than 50% counseling about above condition  and coordination of treatment plan.      MD sanaz MCCURDY 05/21/2017 12:15:21  T 05/22/2017 05:45:36  R 05/22/2017 05:45:36  [id number]        cc:JOSE SILVA DO

## 2021-06-10 NOTE — TELEPHONE ENCOUNTER
Medication being requested: Hydromorphone  Last visit date: 7/15  Provider: [facility]  Next visit date: 9/16  Provider: [facility]I  Expected follow up: 8 weeks  [facility] visit ([facility]) date: [facility]  UDT date: 11/2019  Agreement date: 11/2019   (Last fill date; name; strength; provider; MME; quantity):   reviewed, last filled 7/15 for # 84  Pertinent between visit information about requested medication (telephone, mychart, prior authorization, concerns, comments): Use the hydrocodone 4 mg 4-6 times a day depending upon flareups  Script being sent to provider by nurse- dates and quantity:  Requested Prescriptions     Pending Prescriptions Disp Refills     HYDROmorphone (DILAUDID) 4 MG tablet 84 tablet 0     Sig: Take 1 tablet (4 mg total) by mouth every 4 (four) hours as needed for pain.     [facility] c[facility]  Standing orders for withdrawal protocol implemented: godwin

## 2021-06-10 NOTE — TELEPHONE ENCOUNTER
Refill request: hydromorphone, no encounters since this last filled and template completed.  Requested Prescriptions     Pending Prescriptions Disp Refills     HYDROmorphone (DILAUDID) 4 MG tablet 84 tablet 0     Sig: Take 1 tablet (4 mg total) by mouth every 4 (four) hours as needed for pain.     Signed Prescriptions Disp Refills     HYDROmorphone (DILAUDID) 4 MG tablet 84 tablet 0     Sig: Take 1 tablet (4 mg total) by mouth every 4 (four) hours as needed for pain.     Authorizing Provider: JOSE HOPPER     Last filled 8/10-14 days    [facility]

## 2021-06-11 NOTE — TELEPHONE ENCOUNTER
Medication being requested: Fentanyl patch  Last visit date: 7/15  Provider:  BE  Next visit date:  9/16  Provider: [facility]I  Expected follow up: 8-12 weeks  [facility] visit ([facility]) date: godwin  UDT date: 11/2019  Agreement date:  11/2019   (Last fill date; name; strength; provider; MME; quantity):   Last filled 8/4, quantity # 10, 30 day supply.    Pertinent between visit information about requested medication (telephone, mychart, prior authorization, concerns, comments): She continues using the fentanyl patch 25 mcg every 3 days.  Script being sent to provider by nurse- dates and quantity:   Requested Prescriptions     Pending Prescriptions Disp Refills     fentaNYL (DURAGESIC) 25 mcg/hr 10 patch 0     Sig: Place 1 patch on the skin every third day.     [facility] c[facility]: [facility]  Standing orders for withdrawal protocol implemented: godwin

## 2021-06-11 NOTE — PROGRESS NOTES
"Assessment/Plan:     Problem List Items Addressed This Visit     Midback Pain    Relevant Medications    fentaNYL (DURAGESIC) 25 mcg/hr (Start on 10/10/2020)    Chronic Pain    Relevant Medications    HYDROmorphone (DILAUDID) 4 MG tablet              No follow-ups on file.    Patient Instructions   PLAN:    Discussed following anti-inflammatory diet may decrease flareups with your back pain, and help with constipation.  This includes decreasing the high fructose corn syrup, sugar, soda, and avoiding the aspartame or NutraSweet in diet soda.    We are sending article about the anti-inflammatory diet from Dr. Curry, an article about the mitochondrial diet which is anti-inflammatory    Continue with the fentanyl 25 mcg patch 3 3 days, and reviewed you are taking hydromorphone 4 mg up to 5 times a day.    Follow-up with Dr. Vazquez in 8 weeks.        Subjective:       60 y.o. female Lisa Burr is a 60 y.o. female who is being evaluated via a billable video visit.      The patient has been notified of following:     \"This video visit will be conducted via a call between you and your physician/provider. We have found that certain health care needs can be provided without the need for an in-person physical exam.  This service lets us provide the care you need with a video conversation.  If a prescription is necessary we can send it directly to your pharmacy.  If lab work is needed we can place an order for that and you can then stop by our lab to have the test done at a later time.    Video visits are billed at different rates depending on your insurance coverage. Please reach out to your insurance provider with any questions.    If during the course of the call the physician/provider feels a video visit is not appropriate, you will not be charged for this service.\"    Patient has given verbal consent to a Video visit? yes  How would you like to obtain your AVS?   If dropped by the video visit, the video " "invitation should be sent to:   Will anyone else be joining your video visit?         Doximety    Neuro reviews having an episode of significant pain in her low back, woke up with it, had difficulty walking, going down her buttocks into the right thigh.  Has had other flareups not quite as bad.  She wonders whether could correlate with constipation.  We discussed possibly some referred pain she has had significant number of surgeries and urogynecologic concerns.  We have tried a variety of agents for opioid-induced constipation which are insurance is not covered.  This led to discussion of diet and anti-inflammatory diet.  She describes as \"opposite\" of what she consumes.  She does not have much soda, more diet soda we discussed aspartame is neurotoxic.  She acknowledges having lots of sugar in her diet.  She is interested in learning more about it.    She has had limited activity, mostly going out to her backyard.  Very concerned about the virus.  She has made but canceled appointments for her hearing with dentists.    She has been taking the fentanyl 25 mcg every 3 days and the hydrocodone 4 mg usually 4 to 5 tablets a day.    She is not interested in going to physical therapy again concern for the virus.          Current Outpatient Medications:      buPROPion (WELLBUTRIN XL) 150 MG 24 hr tablet, Take 450 mg by mouth daily., Disp: , Rfl:      [START ON 10/10/2020] fentaNYL (DURAGESIC) 25 mcg/hr, Place 1 patch on the skin every third day., Disp: 10 patch, Rfl: 0     HYDROmorphone (DILAUDID) 4 MG tablet, Take 1 tablet (4 mg total) by mouth every 4 (four) hours as needed for pain., Disp: 70 tablet, Rfl: 0     lamoTRIgine (LAMICTAL) 200 MG tablet, Take 1 tablet (200 mg total) by mouth daily., Disp: 30 tablet, Rfl: 2     naloxone (NARCAN) 4 mg/actuation nasal spray, 1 spray (4 mg dose) into one nostril for opioid reversal. Call 911. May repeat if no response in 3 minutes., Disp: 1 Box, Rfl: 0     QUEtiapine (SEROQUEL) " "25 MG tablet, Take 25 mg by mouth at bedtime. Can take up to 3 a night, Disp: , Rfl:      traZODone (DESYREL) 300 MG tablet, TK 1 T PO QD HS, Disp: , Rfl: 2     venlafaxine (EFFEXOR-XR) 150 MG 24 hr capsule, Take 2 capsules (300 mg total) by mouth daily., Disp: 60 capsule, Rfl: 0     VITAMIN D3 50 mcg (2,000 unit) tablet, Take 1 tablet by mouth daily., Disp: , Rfl:      tiZANidine (ZANAFLEX) 4 MG tablet, One-half tab four times a day, may increase after 4 days to one four times a day, Disp: 75 tablet, Rfl: 2    Video was alert, clear sensorium.  Thought process logical.  Affect is constricted.             Objective:     Vitals:    09/16/20 1320   Height: 5' 6\" (1.676 m)   PainSc:   7   PainLoc: Back       Assessment: Patient has had history of lumbar spondylolysis, comorbid pelvic floor pain with history of mesh after her gynecologic placements.    Has had caution with opioids, with awareness that higher doses of fentanyl associated with subjective sense of difficulty breathing.    She has been cautious about physical therapy and injections.    Plan: Provide information about the anti-inflammatory diet.    Discussed she may wish to contact ways to wellness to learn what activities are available such as surya chi and should go on all mind that she can do to keep working.    We will make no changes in her opioids at the present.    Total time more than 15 minutes.            This note has been dictated using voice recognition software. Any grammatical or context distortions are unintentional and inherent to the software  "

## 2021-06-11 NOTE — PROGRESS NOTES
"Lisa Burr is a 60 y.o. female who is being evaluated via a billable video visit.      The patient has been notified of following:     \"This video visit will be conducted via a call between you and your physician/provider. We have found that certain health care needs can be provided without the need for an in-person physical exam.  This service lets us provide the care you need with a video conversation.  If a prescription is necessary we can send it directly to your pharmacy.  If lab work is needed we can place an order for that and you can then stop by our lab to have the test done at a later time.    Video visits are billed at different rates depending on your insurance coverage. Please reach out to your insurance provider with any questions.    If during the course of the call the physician/provider feels a video visit is not appropriate, you will not be charged for this service.\"    Patient has given verbal consent to a Video visit? Yes  How would you like to obtain your AVS? AVS Preference: Mail a copy.  If dropped by the video visit, the video invitation should be sent to: Text to cell phone: [phone]  Will anyone else be joining your video visit? No    Patient is here for a follow up appointment with Dr. Vazquez. Patient has low back, left foot pain and buttocks to thigh on the right side, describes the pain as constant, shooting, throbbing and burning,rates the pain as 7/10. Patient needs refills for hydromorphone and Fentanyl patches.  CSA and UDT are deferred due to COVID 19. Functionality is 8. Patient states their pain interferes with sleep, walking, work, ADL's and relationships.    Dorothea Delgado, MESHA  "

## 2021-06-11 NOTE — PROGRESS NOTES
"Chronic pain progress note: Date of service 7-7    Lisa reports feeling about the same.  She notes over the weekend having family and even though her daughters a lot of the work she was also active in cleaning, weeding her garden, and back flared up for a few days better today.    She has been using ties adding at bedtime seemed to be helpful.  Usually she takes Seroquel and trazodone.    She continues with the fentanyl patch 25 mcg and 12 mcg.  She will still the occasional problems with her breathing though not as often.  It does not happen every time she changes a patch.  Continues with pain in her back in her left foot.    She did not look into the Curcumin we discussed.    Current Outpatient Prescriptions on File Prior to Encounter   Medication Sig Dispense Refill     buPROPion (WELLBUTRIN XL) 150 MG 24 hr tablet   0     fentaNYL (DURAGESIC) 12 mcg/hr Place 1 patch on the skin every third day. [employer] Brand Only 10 patch 0     lactulose 10 gram/15 mL solution TAKE 30 ML BY MOUTH THREE TIMES DAILY 240 mL 4     lamoTRIgine (LAMICTAL) 200 MG tablet Take 200 mg by mouth.       tiZANidine (ZANAFLEX) 4 MG tablet One bedtime and may repeat after four hours 60 tablet 1     traZODone (DESYREL) 300 MG tablet TK 1 T PO QD HS  2     venlafaxine (EFFEXOR-XR) 150 MG 24 hr capsule Take 300 mg by mouth daily.       No current facility-administered medications on file prior to encounter.      /73 (Patient Site: Right Arm, Patient Position: Sitting)  Pulse 97  Resp 14  Ht 5' 5\" (1.651 m)  Wt 115 lb (52.2 kg)  BMI 19.14 kg/m2  She is alert with a clear sensorium thought process tight logical.  She is fanning herself and we reviewed there may be temperature dysregulation with the opioids.    Impression chronic pain is related to low back pain with myofascial component in radicular component.  We have been working on decreasing the opioids that she has noted respiratory effective from the higher dose of the fentanyl.  She " does not endorse any abuse concerns with the fentanyl that was concern about oxycodone.    Plan we discussed options for tapering.  Reviewed the role of high dose could Sarah which may help with pain.    We will decrease the fentanyl to 25 mcg, discussed possible withdrawal component to be managing.  Reviewed if it is intolerable we can increase the dose.    Time spent 15 minutes face-to-face more than 50% counseling about above condition coordination treatment plan

## 2021-06-12 NOTE — TELEPHONE ENCOUNTER
"Medication being requested: HYDROMORPHONE  Last visit date: 9/16.  Provider: BE  Next visit date: 12/16.  Provider: BE  Expected follow up: 8WK    UDT date: 11/19  Agreement date: 11/19   (Last fill date; name; strength; provider; MME; quantity):  REVIEWED ; LAST FILLED 10/5 2 WEEK SUPPLY  Pertinent between visit information about requested medication (telephone, mychart, prior authorization, concerns, comments): LEFT MESSAGE RE: REFILL AND STATED \"HAD SHOOTING PAINS ON RIGHT SIDE\"   Script being sent to provider by nurse- dates and quantity: DUE TODAY  Requested Prescriptions     Pending Prescriptions Disp Refills     HYDROmorphone (DILAUDID) 4 MG tablet 70 tablet 0     Sig: Take 1 tablet (4 mg total) by mouth every 4 (four) hours as needed for pain.       Pharmacy cued: [facility]      "

## 2021-06-12 NOTE — PROGRESS NOTES
"Lisa reviews having surgery last month with concern for bleeding she has had for 3 years, learned that her bladder mesh has eroded vaginally and rectally.  She was told she will need further assessment at the [facility] to have a team, notes that there is discussion about  possible colostomy.  Been some increased pain, was given some oxycodone which did help with the postsurgical pain, did not help her back she notes.  Pain is settled down was still some cramping.    She reviews the original surgery she had for the also led to the nerve injury to her left foot.    She reviews there is been some discussion about hormone injection.  As such she was ambivalent about taking the high-dose determine which she discussed.  She also notes her memory is been poor and she is been concerned about that.    We reviewed the goals she had about decreasing the fentanyl.  She notes at this point her goal is to be more comfortable.  She had been taking the 25 mcg patch, when that ran out had some 12 mcg patches remaining.  We discussed the last prescription for the fentanyl patch was on 7 7 #10, and she acknowledges she may have some memory issues about how she is taking it as well.  She is not having any breathing concerns as we have decreased the opioids.  She wondered about combination of the fentanyl patch 25 mcg and cutting a 12 mcg patch in half.  We discussed cutting these patches cannot be safely done.    Blood pressure 106/58, pulse 99, resp. rate 16, height 5' 5\" (1.651 m), weight 115 lb (52.2 kg), not currently breastfeeding.    Alert clear sensorium good eye contact thought process tight logical.  Pain score 7    Impression chronic pain relates to history of low back pain, with some radiculopathy diffuse myofascial pain.  We have been decrease in the opioids as there is concern for respiratory problems at higher doses.    Plan we will resume the fentanyl 25 mcg patch and she continues to explore the next step for her " gynecologic surgery.    We discussed other agents to augment the fentanyl rather than increasing the dose or using other opioids for breakthrough.  Discussed the use of PEA--palmatoylethanolamide, as one agent and discussed resources.  Discussed the use of high-dose Curcumin as another.    Reviewed the role of the ketogenic diet and its relationship to cognitive concerns.  Sources were provided    Time spent 25 minutes face to face, more than 50% counselling about above conditions and coordination of treatment plan

## 2021-06-12 NOTE — TELEPHONE ENCOUNTER
Calls in complaining of back pain.  She describes 2 or 3 weeks ago she fell tripping over her cat landing on her back.  She has a change in her back pain, her buttocks rating down the right leg.  In some ways feels like when she is at disc disease.  Also concerned as she has not had mesh placement with gynecologic and bowel surgery.    Has not had an MRI since 5/19.    We discussed we will order an MRI.  She will also contact her GYN: Surgeons that may need to have an evaluation to see if change.  Graph has been on the fentanyl patch 25 mcg.  For refill of the hydromorphone.  Feels that has not been working as well.  We will rotate to oxycodone, discussed rotation

## 2021-06-12 NOTE — TELEPHONE ENCOUNTER
Medication being requested: Dilaudid  Last visit date: 09/16.  Provider: BE  Next visit date: 12/16.  Provider: BE  Expected follow up: 8 wks  [facility]M visit ([facility]) date: N/A  UDT date: 11/2019  Agreement date: 11/2019   (Last fill date; name; strength; provider; MME; quantity):  Last filled 09/18 #70.  appropriate  Pertinent between visit information about requested medication (telephone, mychart, prior authorization, concerns, comments): Next follow-up will be 1 month later than expected.  Script being sent to provider by nurse- dates and quantity:   Requested Prescriptions     Pending Prescriptions Disp Refills     HYDROmorphone (DILAUDID) 4 MG tablet 70 tablet 0     Sig: Take 1 tablet (4 mg total) by mouth every 4 (four) hours as needed for pain.     [facility]  Standing orders for withdrawal protocol implemented: N/A

## 2021-06-12 NOTE — TELEPHONE ENCOUNTER
Adding fentanyl 25 mcg patch to recently completed template.  No significant chart encounters since completed.  Call placed to pharmacy, refill on profile that pharmacy will get ready for the patient.

## 2021-06-12 NOTE — TELEPHONE ENCOUNTER
"Patient continues to call, voicemail regarding the same information left previously.  Patient has been advised to be assessed and on this voicemail also reports that \"the other Md doesn't want to do anything additional for her pain\"  Chart review:  Unable to see other encounters related to this patient following up on new/acute pain.  "

## 2021-06-12 NOTE — TELEPHONE ENCOUNTER
"Patient calls, voicemail reporting that she has \"not her normal pain\". She describes this in her lower back right side and down that leg and around the front of the leg.  Call placed to patient:  Advised to go in and have this assessed as this is not her typical chronic pain.  She may needs scans to assess if there is something new going on.  This information was left via voicemail and for patient to call back with any further questions.  "

## 2021-06-13 NOTE — PROGRESS NOTES
Critical access hospital Clinic Note    Lisa Burr   57 y.o. female    Date of Visit: 10/3/2017  Chief Complaint   Patient presents with     Nasal Congestion     Wheezing       ASSESSMENT/PLAN  1. Acute bronchitis     2. Acute sinusitis       ---------------------------------------------    Due to the lack of fever or vitals abnormalities, I suspect she has lower respiratory tract infection in the form of acute bronchitis, also persisting and worsening sinus infection symptoms for the last 1 week.  I would recommend azithromycin, albuterol inhaler given the chest tightness/shortness of breath.  If not improving, could consider another alternate medication such as doxycycline.    Return if symptoms worsen or fail to improve, for Next scheduled follow up.      SUBJECTIVE  Lisa Burr is a 57-year-old woman who presents for acute illness.  She describes having a sore throat which started about 7 days ago, nasal congestion with green mucus production, evolving to cough, wheezing, and tightness of her chest.  She has experienced chills but no fever.  A friend of hers had pneumonia recently.  She has not tried anything including over-the-counter treatments.  Overall, seems to be getting worse.      Medications, allergies, and problem list were reviewed and updated    Patient Active Problem List   Diagnosis     Sciatica     Pelvic Pain     Midback Pain     Chronic Pain     Cervicalgia     Opioid Dependence With Continuous Use     Arthropathy Of The Ankle / Foot     Cervical Spondylosis     Lumbar Spondylosis     Bipolar Disorder     Generalized Anxiety Disorder     Drug Dependence     Bulimia Nervosa     Attention Deficit Disorder Without Hyperactivity     Anemia     Drug-induced constipation     Past Medical History:   Diagnosis Date     Anemia      Anxiety      Back pain     low back and neck     Depression      GERD (gastroesophageal reflux disease)     states takes Tums 2-3 times/week     Irritable bowel syndrome  (IBS)      Postmenopausal bleeding      Scoliosis      Current Outpatient Prescriptions   Medication Sig Dispense Refill     buPROPion (WELLBUTRIN XL) 150 MG 24 hr tablet Take 450 mg by mouth daily.       cloNIDine HCl (CATAPRES) 0.1 MG tablet TAKE 1 TABLET BY MOUTH THREE TIMES DAILY AS NEEDED 20 tablet 0     fentaNYL (DURAGESIC) 25 mcg/hr Place 1 patch on the skin every third day. 10 patch 0     lactulose 10 gram/15 mL solution TAKE 30 ML BY MOUTH THREE TIMES DAILY 240 mL 4     lamoTRIgine (LAMICTAL) 200 MG tablet Take 200 mg by mouth.       medroxyPROGESTERone (PROVERA) 10 MG tablet Take 1 tablet by mouth daily. Once daily for 12 days out of the month  0     oxyCODONE-acetaminophen (PERCOCET) 5-325 mg per tablet Take 1-2 tablets by mouth every 6 (six) hours as needed.  0     QUEtiapine (SEROQUEL) 25 MG tablet Take 25 mg by mouth at bedtime. Can take up to 3 a night       tiZANidine (ZANAFLEX) 4 MG tablet One bedtime and may repeat after four hours 60 tablet 1     traZODone (DESYREL) 300 MG tablet TK 1 T PO QD HS  2     venlafaxine (EFFEXOR-XR) 150 MG 24 hr capsule Take 300 mg by mouth daily.       albuterol (PROAIR HFA;PROVENTIL HFA;VENTOLIN HFA) 90 mcg/actuation inhaler Inhale 2 puffs every 6 (six) hours as needed for wheezing. 1 each 0     azithromycin (ZITHROMAX) 250 MG tablet Take 500mg (2 tablets) day one, and then 250mg (1 tablet) days 2-5 6 tablet 0     No current facility-administered medications for this visit.      Allergies   Allergen Reactions     Penicillins        EXAM  Vitals:    10/03/17 1454   BP: 102/64   Patient Site: Left Arm   Patient Position: Sitting   Cuff Size: Adult Regular   Pulse: 90   Temp: 97.9  F (36.6  C)   TempSrc: Tympanic   Weight: 118 lb (53.5 kg)     General: Alert, no distress  ENT: Sclera nonicteric, oral mucosa moist, posterior pharynx normal appearance, maxillary sinus tenderness to palpation  Heart: Regular rhythm, no murmurs  Lungs: Coarse inspiratory and expiratory  crackles in the left midlung area, not clearing with cough.    Tyler Rodarte, DO  Internal Medicine  [facility]

## 2021-06-13 NOTE — TELEPHONE ENCOUNTER
"Medication being requested: Oxycodone  Last visit date: 9/16   Provider: BE  Next visit date:  12/16      Provider: BE  Expected follow up: 8 weeks  MTM visit (Pain Center) date: na  UDT date: 11/2019  Agreement date: 11/2019   (Last fill date; name; strength; provider; MME; quantity):  reviewed: last filled 11/16, # 56, 14 day supply, refill due date 11/30    Pertinent between visit information about requested medication (telephone, mychart, prior authorization, concerns, comments): Patient left message that \"this is not working and she wants the stronger medication\"   Continue with the fentanyl 25 mcg patch 3 3 days, and reviewed you are taking hydromorphone 4 mg up to 5 times a day.  For refill of the hydromorphone.  Feels that has not been working as well.  We will rotate to oxycodone, discussed rotation.  Hydromorphone queued, please review, unclear on plan of rotating Oxycodone and Hydromorphone.  Script being sent to provider by nurse- dates and quantity:   Requested Prescriptions     Pending Prescriptions Disp Refills     HYDROmorphone (DILAUDID) 4 MG tablet 84 tablet 0     Sig: Take 1 tablet (4 mg total) by mouth every 4 (four) hours as needed for pain.     [facility]  Standing orders for withdrawal protocol implemented: na  "

## 2021-06-13 NOTE — PATIENT INSTRUCTIONS - HE
PLAN:  Referral will be made to [facility] to obtain a TENS unit for your back pain.    You may schedule an MRI when you feel comfortable due to the Covid virus.  The fentanyl patch 25 mcg every 3 days.    Change oxycodone to morphine 15 mg immediate release 1 every 8 hours.  Call after 1 week to discuss dosing    Follow-up with Dr. Vazquez in 8 to 12 weeks.

## 2021-06-13 NOTE — PROGRESS NOTES
Urine Drug Test: 12/17/2020     Medication:   Last dose of scheduled oxycodone at 0500 this am, MSIR 100 this am    Witnessed Patch Location: rt hip, changed on Tuesday    Medication Current List    Current Outpatient Medications:      buPROPion (WELLBUTRIN XL) 150 MG 24 hr tablet, Take 450 mg by mouth daily., Disp: , Rfl:      [START ON 1/10/2021] fentaNYL (DURAGESIC) 25 mcg/hr, Place 1 patch on the skin every third day., Disp: 10 patch, Rfl: 0     lamoTRIgine (LAMICTAL) 200 MG tablet, Take 1 tablet (200 mg total) by mouth daily., Disp: 30 tablet, Rfl: 2     morphine (MSIR) 15 MG tablet, Take 1 tablet (15 mg total) by mouth 3 (three) times a day as needed for pain., Disp: 21 tablet, Rfl: 0     naloxone (NARCAN) 4 mg/actuation nasal spray, 1 spray (4 mg dose) into one nostril for opioid reversal. Call 911. May repeat if no response in 3 minutes., Disp: 1 Box, Rfl: 0     oxyCODONE (ROXICODONE) 10 mg immediate release tablet, Take 1 tablet (10 mg total) by mouth every 6 (six) hours as needed for pain., Disp: 8 tablet, Rfl: 0     QUEtiapine (SEROQUEL) 25 MG tablet, Take 25 mg by mouth at bedtime. Can take up to 3 a night, Disp: , Rfl:      tiZANidine (ZANAFLEX) 4 MG tablet, One-half tab four times a day, may increase after 4 days to one four times a day, Disp: 75 tablet, Rfl: 2     traZODone (DESYREL) 300 MG tablet, TK 1 T PO QD HS, Disp: , Rfl: 2     venlafaxine (EFFEXOR-XR) 150 MG 24 hr capsule, Take 2 capsules (300 mg total) by mouth daily., Disp: 60 capsule, Rfl: 0     VITAMIN D3 50 mcg (2,000 unit) tablet, Take 1 tablet by mouth daily., Disp: , Rfl:     Personal belongings: Left outside the bathroom.    Comments: UDT collected and CSA signed

## 2021-06-13 NOTE — PROGRESS NOTES
Lisa reviews an evaluation at the [facility].  She is been told that the mesh is eroding into her rectum.  They would need to do a resection with a 6 hour surgery.  He will have a colonoscopy for 3 months with no guarantee that it would be taken down.    She continues with pain taken she eats with cramping.  With bowel movements it is painful and she has to go to bed afterwards.  She is scheduled this surgeryJanuary as she wants to get through the holidays.    She has been bleeding for 3 years, noted took a while for anyone to find out, concerned she had an infection.  Has been told she does not have the procedure could be at risk for rectovaginal fistula.    They described continue on the fentanyl a good thing as some patients tend to have loose and frequent stooling with the colonoscopy.    I discussed other approaches such as using opioid or diazepam suppositories.  She is not interested in that.    Reviews concern about sexual functioning this was discussed.    We discussed whether be helpful to work with a psychotherapist as this is a big life adjustment to review when she describes some depression.  Declines this at present.    We discussed it could be full to taper opioids before the surgery and the role of opioid-induced hyperalgesia.  She will consider that.    Current Outpatient Prescriptions:      azithromycin (ZITHROMAX) 250 MG tablet, Take 500mg (2 tablets) day one, and then 250mg (1 tablet) days 2-5, Disp: 6 tablet, Rfl: 0     buPROPion (WELLBUTRIN XL) 150 MG 24 hr tablet, Take 450 mg by mouth daily., Disp: , Rfl:      cloNIDine HCl (CATAPRES) 0.1 MG tablet, TAKE 1 TABLET BY MOUTH THREE TIMES DAILY AS NEEDED, Disp: 20 tablet, Rfl: 0     lactulose 10 gram/15 mL solution, TAKE 30 ML BY MOUTH THREE TIMES DAILY, Disp: 240 mL, Rfl: 4     lamoTRIgine (LAMICTAL) 200 MG tablet, Take 200 mg by mouth., Disp: , Rfl:      medroxyPROGESTERone (PROVERA) 10 MG tablet, Take 1 tablet by mouth daily. Once daily for 12  "days out of the month, Disp: , Rfl: 0     QUEtiapine (SEROQUEL) 25 MG tablet, Take 25 mg by mouth at bedtime. Can take up to 3 a night, Disp: , Rfl:      tiZANidine (ZANAFLEX) 4 MG tablet, One bedtime and may repeat after four hours, Disp: 60 tablet, Rfl: 1     traZODone (DESYREL) 300 MG tablet, TK 1 T PO QD HS, Disp: , Rfl: 2     venlafaxine (EFFEXOR-XR) 150 MG 24 hr capsule, Take 300 mg by mouth daily., Disp: , Rfl:      VENTOLIN HFA 90 mcg/actuation inhaler, INHALE 2 PUFFS BY MOUTH EVERY 6 HOURS AS NEEDED FOR WHEEZING, Disp: 18 g, Rfl: 0     fentaNYL (DURAGESIC) 25 mcg/hr, Place 1 patch on the skin every third day., Disp: 10 patch, Rfl: 0     KETAMINE HCL (KETAMINE, BULK,) 100 % Powd, Ketamine nasal spray 5%, take one spray every six hours, may increase to 2-3 sprays as tolerated, for breakthrough pain, Disp: 30 Bottle, Rfl: 2  Blood pressure 117/56, pulse 94, resp. rate 18, height 5' 6\" (1.676 m), weight 118 lb (53.5 kg), not currently breastfeeding.    In score 8.  She is alert with a clear simple sensorium appropriately groomed.  Affect is congruent and there is some distress.    Chronic pain relates to lumbar radiculopathy and thought to be related complication neurosurgery.  No more recently dealing with pelvic concerns after mesh placement.    Plan we will continue the fentanyl 25 mcg.    Discussed use of ketamine starting in the nasal spray to help with breakthrough pain.  Will use around bowel movements.  Discussed if helpful may allow tapering of fentanyl before her surgery.  Usual side effects discussed.    She may looking to find a therapist closer to home.    Spent 25 minutes face-to-face more than 50% count about above condition and coordination treatment plan  "

## 2021-06-13 NOTE — TELEPHONE ENCOUNTER
"Patient calls, request for the refill of \"the strong one he gives me\"  Patient reports losing her purse and is difficulty to follow on the voicemail, at one point in the voicemail she then reports finding her purse, she is in [place] helping her father who apparently had a stroke.  [facility] provides a [facility] to send her refills to while in [place].  Oxycodone last filled on 11/5-10 days  Fentanyl last filled on 11/09-30 days    It would appear that patient is in need of the oxycodone.  Last ov with BE: 9/16  10/26: encounter with provider discussed that patient will rotate between oxycodone and hydromorphone.  Next ov with BE: 12/16    UDS/CSA 11/2019     Please review and order as appropriate: hydromorphone or [facility]codone.([facility] [place] [facility])        "

## 2021-06-13 NOTE — TELEPHONE ENCOUNTER
Adding next refill: oxycodone  No significant chart encounters since last refill template completed.  Last filled on 12/01, #56, 14 days  Cued as a bridge to the apt on 12/16/20  Requested Prescriptions     Pending Prescriptions Disp Refills     oxyCODONE (ROXICODONE) 10 mg immediate release tablet 8 tablet 0     Sig: Take 1 tablet (10 mg total) by mouth every 6 (six) hours as needed for pain.     Signed Prescriptions Disp Refills     oxyCODONE (ROXICODONE) 10 mg immediate release tablet 56 tablet 0     Sig: Take 1 tablet (10 mg total) by mouth every 6 (six) hours as needed for pain.     Authorizing Provider: JOSE HOPPER

## 2021-06-13 NOTE — TELEPHONE ENCOUNTER
Patient called and stated that she is now back in [place] and would like her prescriptions sent to the [facility] on [address]. Patient also stated that this prescription was sent to [facility] in [place] however, the preferred [facility] in [place] is contacting them to have the prescription transferred here.

## 2021-06-13 NOTE — TELEPHONE ENCOUNTER
Medication being requested: Fentanyl  Last visit date: 09/16.  Provider: BE  Next visit date: 12/16.  Provider: BE  Expected follow up: 8 wks  MTM visit (Pain Center) date: N/A  UDT date: 11/2019  Agreement date: 11/2019   (Last fill date; name; strength; provider; MME; quantity):Unable to access . Last filled 11/09-12/09 according to notes from another refill    Pertinent between visit information about requested medication (telephone, mychart, prior authorization, concerns, comments): Overdue for visit    Script being sent to provider by nurse- dates and quantity:   Requested Prescriptions     Pending Prescriptions Disp Refills     fentaNYL (DURAGESIC) 25 mcg/hr 10 patch 0     Sig: Place 1 patch on the skin every third day.       Ph[facility] cued: [facility]  Standing orders for withdrawal protocol implemented: N/A

## 2021-06-14 NOTE — TELEPHONE ENCOUNTER
Patient calls, 1/19/21, reports two falls in the last week and therefore restarted morphine which she had previously reported not needing for chronic pain.  Call placed to patient:  Reviewed that chronic pain medications should not be taken for acute injuries. Reviewed that she should be assessed by a provider for new acute injuries.  Patient reviews that she exacerbated existing chronic issues and that is why she took morphine.  Will relay this to the provider for further recommendations  Also transferred patient to  to get on providers schedule for a follow up.

## 2021-06-14 NOTE — TELEPHONE ENCOUNTER
Patient calls, voicemail reporting that she is no longer taking morphine and does not feel she is needing this at this time.  She reports additional back pain last week and once she was able to have a BM this resolved.

## 2021-06-14 NOTE — TELEPHONE ENCOUNTER
He describes 2 recent falls, one was outside of some recent steps.  Other in the house, is being more careful, thinks she tripped over her sandals.    His use morphine infrequently, not for several days.  Reviews as needed call if any flareups

## 2021-06-14 NOTE — TELEPHONE ENCOUNTER
Medication being requested: MS IR 15 mg  Last visit date: 12/16     Provider: MAXI  Next visit date: needs scheduling   Provider: MAXI  Expected follow up: 8-12 weeks  MTM visit ([facility] Center) date: godwin  UDT date: 12/2020  Agreement date:  12/2020   (Last fill date; name; strength; provider; MME; quantity):  reviewed: MS last filled 12/16, # 21 tablets , 7 day fill, refill due date 12/23    Pertinent between visit information about requested medication (telephone, mychart, prior authorization, concerns, comments): Change oxycodone to morphine 15 mg immediate release 1 every 8 hours.  Call after 1 week to discuss dosing. 12/23 Patient left message for refill with no status update, left message for patient to call back with update.  Script being sent to provider by nurse- dates and quantity:   Requested Prescriptions     Pending Prescriptions Disp Refills     morphine (MSIR) 15 MG tablet 21 tablet 0     Sig: Take 1 tablet (15 mg total) by mouth 3 (three) times a day as needed for pain.     Pharmacy cued: [facility]  Standing orders for withdrawal protocol implemented: godwin

## 2021-06-15 NOTE — PROGRESS NOTES
"Lisa reviews a trial of ketamine caused some GI distress.  It did not seem to last very long for pain.  She also had the sensation that she might want to take more of it and \"did not recommend for addicts\".    She continues on the fentanyl 37.5 mg.  Is doing relatively better.  There is still some pain.    She reviews while she was in [place] at her parents house near the end she was helping do more cleaning seem to flare of back pain right at the end.  She also got sick throwing up for a couple of days.  She could not describe what that was for.    As we talked more she reviewed that she is a close relationship with her mother, but a very difficult relationship with her father.  May judgments about her past substance use, he continues to be active alcoholic.  She also overheard him making disparaging comments to the people that visited.  As we spoke further she acknowledged that the vomiting could have been related to the trauma associated with her father and their history.          She is scheduled in early February at the [facility] for her surgery.  She is concerned it may be rather extensive and is told she needs to spend 7-10 days in the hospital.  If she does not have it however there are also long-term concerns.   We discussed ways to repair for the surgery in terms of working with psychotherapist to do relaxation training, hypnosis, perhaps E MDR.  She is ambivalent about this.      Current Outpatient Prescriptions:      azithromycin (ZITHROMAX) 250 MG tablet, Take 500mg (2 tablets) day one, and then 250mg (1 tablet) days 2-5, Disp: 6 tablet, Rfl: 0     buPROPion (WELLBUTRIN XL) 150 MG 24 hr tablet, Take 450 mg by mouth daily., Disp: , Rfl:      cloNIDine HCl (CATAPRES) 0.1 MG tablet, TAKE 1 TABLET BY MOUTH THREE TIMES DAILY AS NEEDED, Disp: 20 tablet, Rfl: 0     fentaNYL (DURAGESIC) 12 mcg/hr, Place 1 patch on the skin every third day., Disp: 10 patch, Rfl: 0     [START ON 1/18/2018] fentaNYL (DURAGESIC) " "25 mcg/hr, Place 1 patch on the skin every third day., Disp: 10 patch, Rfl: 0     lactulose 10 gram/15 mL solution, TAKE 30 ML BY MOUTH THREE TIMES DAILY, Disp: 240 mL, Rfl: 4     lamoTRIgine (LAMICTAL) 200 MG tablet, Take 200 mg by mouth., Disp: , Rfl:      medroxyPROGESTERone (PROVERA) 10 MG tablet, Take 1 tablet by mouth daily. Once daily for 12 days out of the month, Disp: , Rfl: 0     QUEtiapine (SEROQUEL) 25 MG tablet, Take 25 mg by mouth at bedtime. Can take up to 3 a night, Disp: , Rfl:      tiZANidine (ZANAFLEX) 4 MG tablet, One bedtime and may repeat after four hours, Disp: 60 tablet, Rfl: 1     traZODone (DESYREL) 300 MG tablet, TK 1 T PO QD HS, Disp: , Rfl: 2     VENTOLIN HFA 90 mcg/actuation inhaler, INHALE 2 PUFFS BY MOUTH EVERY 6 HOURS AS NEEDED FOR WHEEZING, Disp: 18 g, Rfl: 0     venlafaxine (EFFEXOR-XR) 150 MG 24 hr capsule, TAKE 2 CAPSULES BY MOUTH EVERY DAY, Disp: 60 capsule, Rfl: 0  Blood pressure 111/59, pulse 84, resp. rate 16, height 5' 6\" (1.676 m), weight 120 lb (54.4 kg), not currently breastfeeding.    Pain  score 7.  She is alert with a clear sensorium for poorly groomed.  Affect anxious.  Comes tearful as reviews some of the interactions with her father.    Impression: Chronic pain relates to low back pain with radiculopathy, diffuse myofascial pain.  Presently having gynecologic surgery related to past mesh placement.    Plan we will continue with the fentanyl 37.5 mcg.  Discussed the surgeons or will know how to manage her postoperative pain and a welcome to call if needed.    Reviewed again preparatory work she could do for the anxiety.    Reviewed as the ketamine was not helpful, discussed another option oxytocin to consider.    Time spent 15 minutes face-to-face more than 50% counseling about above condition and coordination treatment plan  "

## 2021-06-15 NOTE — TELEPHONE ENCOUNTER
Medication being requested: Fentanyl  Last visit date:  12/16   Provider: BE  Next visit date: 3/3   Provider: BE  Expected follow up: 8-12 weeks  MTM visit ([facility] Center) date: na  UDT date: 12/2020  Agreement date: 12/2020   (Last fill date; name; strength; provider; MME; quantity):  reviewed:  Last filled 1/16,  # 10 patches, 30 day supply, refill due date 2/15    Pertinent between visit information about requested medication (telephone, mychart, prior authorization, concerns, comments): We will continue the fentanyl patch and change to a trial of morphine for relief of more acute pain  Script being sent to provider by nurse- dates and quantity:   Requested Prescriptions     Pending Prescriptions Disp Refills     fentaNYL (DURAGESIC) 25 mcg/hr 10 patch 0     Sig: Place 1 patch on the skin every third day.     Ph[facility]acy cued: [facility]  Standing orders for withdrawal protocol implemented: godwin

## 2021-06-15 NOTE — PATIENT INSTRUCTIONS - HE
PLAN:  Continue the fentanyl 25 mcg patch every 3 days     continue your bowel regimen as you are, will add Colace 100 mg daily    Follow-up with Dr. Vazquez in 12 weeks.

## 2021-06-16 NOTE — TELEPHONE ENCOUNTER
Telephone Encounter by Rand Adler RN at 2/19/2020  8:34 AM     Author: Rand Adler RN Service: -- Author Type: Registered Nurse    Filed: 2/19/2020  8:41 AM Encounter Date: 2/19/2020 Status: Signed    : Rand Adler RN (Registered Nurse)       Medication being requested: Hydromprphone  Last visit date: 12/31  Provider: BE  Next visit date: 3/4   Provider: BE  Expected follow up: 8 weeks  MTM visit (Pain Center) date: na  UDT date: 11/2019  Agreement date:  11/2019   snipped in:    Pertinent between visit information about requested medication (telephone, mychart, prior authorization, concerns, comments): na  Script being sent to provider by nurse- dates and quantity:   Requested Prescriptions     Pending Prescriptions Disp Refills   ? HYDROmorphone (DILAUDID) 4 MG tablet 84 tablet 0     Sig: Take 1 tablet (4 mg total) by mouth every 4 (four) hours as needed for pain.     Ph[facility]acy cued: [facility]  Standing orders for withdrawal protocol implemented: godwin

## 2021-06-16 NOTE — TELEPHONE ENCOUNTER
Telephone Encounter by Antonella Jarrell RN at 9/30/2019  3:23 PM     Author: Antonella Jarrell RN Service: -- Author Type: Registered Nurse    Filed: 9/30/2019  3:31 PM Encounter Date: 9/30/2019 Status: Signed    : Antonella Jarrell RN (Registered Nurse)       Medication being requested: Dilaudid  Last visit date:09/10 Provider: BE  Next visit date: 11/05 Provider: MAXI [facility] and [facility]- 10/2018  Expected follow up: 8 wks  MTM visit (Pain Center) date: N/A  Pertinent between visit information about requested medication (telephone, mychart, prior authorization, concerns): Med ordered to match Fentanyl refill      Spoke with patient: yes  Script being sent to provider - dates and quantity:   Requested Prescriptions     Pending Prescriptions Disp Refills   ? HYDROmorphone (DILAUDID) 4 MG tablet 96 tablet 0     Sig: Take 1 tablet (4 mg total) by mouth every 4 (four) hours as needed for pain.         Ph[facility] cued: [facility]- nurse will take to pharmacy tonight

## 2021-06-16 NOTE — TELEPHONE ENCOUNTER
Telephone Encounter by Melida Dickson RN at 7/11/2019 11:19 AM     Author: Melida Dickson RN Service: -- Author Type: Registered Nurse    Filed: 7/11/2019 11:24 AM Encounter Date: 7/11/2019 Status: Signed    : Melida Dickson RN (Registered Nurse)       Medication being requested: dilaudid 4 mg  Last visit date: 5/29  Provider: BE  Next visit date: 7/16.  Provider: BE  Expected follow up: 8 weeks  MTM visit ([facility]) date: no  UDS,CSA 10/2018   snipped in:    Pertinent between visit information about requested medication (telephone, mychart, prior authorization, concerns, comments):   Script being sent to provider by nurse- dates and quantity:   Requested Prescriptions     Pending Prescriptions Disp Refills   ? HYDROmorphone (DILAUDID) 4 MG tablet 84 tablet 0     Sig: Take 1 tablet (4 mg total) by mouth every 4 (four) hours as needed for pain.     [facility]  Standing orders for withdrawal protocol implemented: NA

## 2021-06-16 NOTE — TELEPHONE ENCOUNTER
Telephone Encounter by Althea Hoffman RN at 8/12/2019  9:38 AM     Author: Althea Hoffman RN Service: -- Author Type: Registered Nurse    Filed: 8/12/2019  9:46 AM Encounter Date: 8/12/2019 Status: Signed    : Althea Hoffman RN (Registered Nurse)       Medication being requested: Hydromorphone  Last visit date: 7/16.  Provider: ronald  Next visit date: 9/10.  Provider: ronald  Expected follow up: 8 weeks  MTM visit ([facility]) date: no  UDT date: 10/2018  Agreement date: 10/2018   snipped in:    Pertinent between visit information about requested medication (telephone, mychart, prior authorization, concerns, comments): no  Script being sent to provider by nurse- dates and quantity:   Requested Prescriptions     Pending Prescriptions Disp Refills   ? HYDROmorphone (DILAUDID) 4 MG tablet 84 tablet 0     Sig: Take 1 tablet (4 mg total) by mouth every 4 (four) hours as needed for pain.     [facility]  Standing orders for withdrawal protocol implemented:na

## 2021-06-16 NOTE — TELEPHONE ENCOUNTER
Telephone Encounter by Rand Adler RN at 3/17/2020 11:46 AM     Author: Rand Adler RN Service: -- Author Type: Registered Nurse    Filed: 3/17/2020 12:00 PM Encounter Date: 3/17/2020 Status: Signed    : Rand Adler RN (Registered Nurse)       Medication being requested: Hydromorphone  Last visit date:  3/4  Provider: BE  Next visit date: 5/19  Provider: BE  Expected follow up: 8 weeks  MTM visit ([facility]) date: na  UDT date: 11/2019  Agreement date: 11/2019   snipped in:      Pertinent between visit information about requested medication (telephone, mychart, prior authorization, concerns, comments):She continues with the hydromorphone 4 mg 3 times a day   Script being sent to provider by nurse- dates and quantity:   Requested Prescriptions     Pending Prescriptions Disp Refills   ? HYDROmorphone (DILAUDID) 4 MG tablet 84 tablet 0     Sig: Take 1 tablet (4 mg total) by mouth every 4 (four) hours as needed for pain.     Pharmacy cued: [facility]  Standing orders for withdrawal protocol implemented: godwin

## 2021-06-16 NOTE — TELEPHONE ENCOUNTER
Telephone Encounter by Melida Dickson RN at 6/26/2019  9:12 AM     Author: Melida Dickson RN Service: -- Author Type: Registered Nurse    Filed: 6/26/2019  9:20 AM Encounter Date: 6/26/2019 Status: Signed    : Melida Dickson RN (Registered Nurse)       Refill request: hydromorphone 4 mg  Last visit date: 5/29  Provider: BE  Hydromorphone may increase to 4 mg six times a day  Next visit date: 7/16.  Provider: BE  Expected follow up: 8 weeks  MTM visit ([facility]) date: no  UDS,CSA 10/2018   snipped in:    Pertinent between visit information about requested medication (telephone, mychart, prior authorization, concerns, comments):   Na   Script being sent to provider by nurse- dates and quantity:   Requested Prescriptions     Pending Prescriptions Disp Refills   ? HYDROmorphone (DILAUDID) 4 MG tablet 84 tablet 0     Sig: Take 1 tablet (4 mg total) by mouth every 4 (four) hours as needed for pain.     [facility]  Standing orders for withdrawal protocol implemented: NA

## 2021-06-16 NOTE — TELEPHONE ENCOUNTER
Telephone Encounter by Melida Dickson RN at 3/8/2019  9:49 AM     Author: Melida Dickson RN Service: -- Author Type: Registered Nurse    Filed: 3/8/2019 10:04 AM Encounter Date: 3/8/2019 Status: Signed    : Melida Dickson RN (Registered Nurse)       Medication being requested: hydromorphone 4 mg  Last visit date: 2/4.  Provider: Dr. Vazquez  Office visit notes:   Higher doses were concerns we have used morphine, 15 mg 3 times a day, and as that had been not sufficient hydromorphone which she has now used 2 mg tablets up to 5 times a day.  She reviews the morphine by itself did not seem to do well but with the hydromorphone seems to do well, covers enough pain.  The hydromorphone alone seem to make her more tired but in combination does not  Next visit date: 4/2.  Provider: Dr. Vazquez  Expected follow up: 8 weeks  MTM visit ([facility] Center) date: not since last office visit  UDT date: 10/17/2018  Agreement date: 10/15/2018   snipped in:    Red Flags/Comments identified on call: [place]  Unclear what medication plan of care is at this time  There are cognitive concerns with this patient.  Pertinent between visit information about requested medication (telephone, mychart, prior authorization):   Cancelled injection visit on 2/19  Script being sent to provider by nurse- dates and quantity:   Not at this time  Pharmacy cued: na  Standing orders for withdrawal protocol implemented: DELFINA

## 2021-06-16 NOTE — TELEPHONE ENCOUNTER
Telephone Encounter by Juanis Phillips RN at 4/1/2019  8:32 AM     Author: Juanis Phillips RN Service: -- Author Type: Registered Nurse    Filed: 4/1/2019  8:41 AM Encounter Date: 4/1/2019 Status: Signed    : Juanis Phillips RN (Registered Nurse)       Medication being requested: dilaudid 4mg  Last visit date: 2/4/19 with Dr. Vazquez  Next visit date: 4/2/19 with Dr. Vazquez  Expected follow up: 8 weeks  MTM visit ([facility]) date: no  UDT date: 10/17/18  Agreement date: 10/15/18   snipped in:    Red Flags/Comments identified on call: no  Pertinent between visit information about requested medication (telephone, mychart, prior authorization):   Script being sent to provider by nurse- dates and quantity:   Requested Prescriptions     Pending Prescriptions Disp Refills   ? HYDROmorphone (DILAUDID) 4 MG tablet 70 tablet 0     Sig: Take 1 tablet (4 mg total) by mouth every 6 (six) hours as needed for pain.     Pharmacy cued: [facility] on [address]  Standing orders for withdrawal protocol implemented: na

## 2021-06-16 NOTE — PROGRESS NOTES
Lisa reviews did postpone the surgery.  She was told it was a six-hour surgery could be complicated.  She notes that there her daughters lost their father last year to addiction issues.  She did not want to risk putting them through another trauma.  She also decided she would like to try to enjoy the summer and then consider surgery in the fall.  She notes concerns with a temporary colostomy , might be permanent.  She still may continue bladder issues.    She continues on the fentanyl 12 and 25 mcg patches.    Reviews her continued neck and back pain.  She has had an MRI in the past, was told there were not further interventions.  Radiofrequency ablations epidural steroid infections were not particularly helpful.    Reviewed her visit to [place] and interaction with her father.  She is not spoken to him since.    Current Outpatient Prescriptions:      azithromycin (ZITHROMAX) 250 MG tablet, Take 500mg (2 tablets) day one, and then 250mg (1 tablet) days 2-5, Disp: 6 tablet, Rfl: 0     buPROPion (WELLBUTRIN XL) 150 MG 24 hr tablet, Take 450 mg by mouth daily., Disp: , Rfl:      cloNIDine HCl (CATAPRES) 0.1 MG tablet, TAKE 1 TABLET BY MOUTH THREE TIMES DAILY AS NEEDED, Disp: 20 tablet, Rfl: 0     fentaNYL (DURAGESIC) 12 mcg/hr, Place 1 patch on the skin every third day., Disp: 10 patch, Rfl: 0     fentaNYL (DURAGESIC) 25 mcg/hr, Place 1 patch on the skin every third day., Disp: 10 patch, Rfl: 0     lactulose 10 gram/15 mL solution, TAKE 30 ML BY MOUTH THREE TIMES DAILY, Disp: 240 mL, Rfl: 4     lamoTRIgine (LAMICTAL) 200 MG tablet, Take 200 mg by mouth., Disp: , Rfl:      medroxyPROGESTERone (PROVERA) 10 MG tablet, Take 1 tablet by mouth daily. Once daily for 12 days out of the month, Disp: , Rfl: 0     oxytocin, bulk, Powd, oxytocin 20 unit troches, dissolve 1 gena TID PRN, Disp: 30 g, Rfl: 2     QUEtiapine (SEROQUEL) 25 MG tablet, Take 25 mg by mouth at bedtime. Can take up to 3 a night, Disp: , Rfl:       "tiZANidine (ZANAFLEX) 4 MG tablet, One bedtime and may repeat after four hours, Disp: 60 tablet, Rfl: 1     traZODone (DESYREL) 300 MG tablet, TK 1 T PO QD HS, Disp: , Rfl: 2     venlafaxine (EFFEXOR-XR) 150 MG 24 hr capsule, TAKE 2 CAPSULES BY MOUTH EVERY DAY, Disp: 60 capsule, Rfl: 0     VENTOLIN HFA 90 mcg/actuation inhaler, INHALE 2 PUFFS BY MOUTH EVERY 6 HOURS AS NEEDED FOR WHEEZING, Disp: 18 g, Rfl: 0  Blood pressure 120/67, pulse 96, resp. rate 16, height 5' 6\" (1.676 m), weight 120 lb (54.4 kg), not currently breastfeeding.    Pain score 7.  She is alert with a clear sensorium good eye contact thought process tight logical.  Tremor as noted in the past.    Impression: Chronic pain relates to low back pain with radiculopathy, diffuse myofascial pain.  Consideration for gynecologic surgery given  past mesh placement.      PLAN: Reviewed again work she can do with anxiety before the surgery such as self hypnosis and relaxation training.  She would like to postpone that until she is determined to have the surgery.    Reviewed supplements could use for the degenerative changes such as high-dose Curcumin, Palmatoylethanolamide, cetyl myrestolate.    We had discussed oxytocin last time, she is open to a trial of that with the goal to decrease the fentanyl if possible.  Discussed off label use, side effects, will start with 20 units 3 times a day.    Time spent 15 minutes face-to-face more than 50% count about above condition and coordination treatment plan      "

## 2021-06-16 NOTE — TELEPHONE ENCOUNTER
Telephone Encounter by Melida Dickson RN at 11/14/2019  1:41 PM     Author: Melida Dickson RN Service: -- Author Type: Registered Nurse    Filed: 11/14/2019  1:42 PM Encounter Date: 11/13/2019 Status: Signed    : Melida Dickson RN (Registered Nurse)

## 2021-06-16 NOTE — TELEPHONE ENCOUNTER
Telephone Encounter by Melida Dickson RN at 11/19/2019  4:51 PM     Author: Melida Dickson RN Service: -- Author Type: Registered Nurse    Filed: 11/20/2019  9:13 AM Encounter Date: 11/19/2019 Status: Addendum    : Melida Dickson RN (Registered Nurse)    Related Notes: Original Note by Melida Dickson RN (Registered Nurse) filed at 11/19/2019  4:52 PM       Refill request: fentanyl 25 mcg patch  Last visit date: 11/05 Provider: BE  Next visit date: 12/31  Provider: BE  Expected follow up: 8 weeks  MTM visit ([facility]) date: no  UDS/CSA 11/2019   snipped in:    Pertinent between visit information about requested medication (telephone, mychart, prior authorization, concerns, comments): NA  Script being sent to provider by nurse- dates and quantity:   Requested Prescriptions     Pending Prescriptions Disp Refills   ? fentaNYL (DURAGESIC) 25 mcg/hr 10 patch 0     Sig: Place 1 patch on the skin every third day.     [facility]  Standing orders for withdrawal protocol implemented: NA

## 2021-06-16 NOTE — TELEPHONE ENCOUNTER
Telephone Encounter by Rand Adler RN at 3/26/2020 11:23 AM     Author: Rand Adler RN Service: -- Author Type: Registered Nurse    Filed: 3/26/2020 11:39 AM Encounter Date: 3/26/2020 Status: Signed    : Rand Adler RN (Registered Nurse)       Medication being requested: Fentanyl  Last visit date: 3/4    Provider: BE  Next visit date:  5/19    Provider: BE  Expected follow up: 8 weeks  MTM visit ([facility]) date: na  UDT date: 11/2019  Agreement date:  11/2019   snipped in:      Pertinent between visit information about requested medication (telephone, mychart, prior authorization, concerns, comments): Continue Fentanyl, Hydromorphone as you are. 3/4 appointment  Script being sent to provider by nurse- dates and quantity:   Requested Prescriptions     Pending Prescriptions Disp Refills   ? fentaNYL (DURAGESIC) 25 mcg/hr 10 patch 0     Sig: Place 1 patch on the skin every third day.     [facility]  Standing orders for withdrawal protocol implemented: na

## 2021-06-16 NOTE — TELEPHONE ENCOUNTER
Telephone Encounter by Althea Hoffman, RN at 2/25/2020 10:41 AM     Author: Althea Hoffman RN Service: -- Author Type: Registered Nurse    Filed: 2/25/2020 10:53 AM Encounter Date: 2/25/2020 Status: Signed    : Althea Hoffman RN (Registered Nurse)       Medication being requested: Fentanyl  Last visit date: 12/31.  Provider:  ronald  Next visit date: 3/4.  Provider: ronald  Expected follow up: 8 weeks  MTM visit ([facility]) date: no  UDT date: 11/2109  Agreement date: 11/2109   snipped in:    Pertinent between visit information about requested medication (telephone, mychart, prior authorization, concerns, comments): none  Script being sent to provider by nurse- dates and quantity:   Requested Prescriptions     Pending Prescriptions Disp Refills   ? fentaNYL (DURAGESIC) 25 mcg/hr 10 patch 0     Sig: Place 1 patch on the skin every third day.     [facility]  Standing orders for withdrawal protocol implemented: na

## 2021-06-16 NOTE — TELEPHONE ENCOUNTER
Telephone Encounter by Melida Dickson RN at 12/3/2019 11:52 AM     Author: Melida Dickson RN Service: -- Author Type: Registered Nurse    Filed: 12/3/2019 11:57 AM Encounter Date: 12/2/2019 Status: Signed    : Melida Dickson RN (Registered Nurse)       Medication being requested: hydromorphone 4 mg  Last visit date: 11/05  Provider: BE  Next visit date: 12/31  Provider: BE  Expected follow up: 8 weeks  MTM visit ([facility]) date: no  UDS/CSA 11/2019   snipped in:    Pertinent between visit information about requested medication (telephone, mychart, prior authorization, concerns, comments):   no  Script being sent to provider by nurse- dates and quantity:   Requested Prescriptions     Pending Prescriptions Disp Refills   ? HYDROmorphone (DILAUDID) 4 MG tablet 84 tablet 0     Sig: Take 1 tablet (4 mg total) by mouth every 4 (four) hours as needed for pain.     [facility]  Standing orders for withdrawal protocol implemented: NA

## 2021-06-16 NOTE — TELEPHONE ENCOUNTER
Telephone Encounter by Juanis Phillips RN at 3/15/2019  2:08 PM     Author: Juanis Phillips RN Service: -- Author Type: Registered Nurse    Filed: 3/15/2019  2:11 PM Encounter Date: 3/15/2019 Status: Signed    : Juanis Phillips RN (Registered Nurse)       Medication being requested: msir 15mg  Last visit date: 2/4/19 with Dr. Vazquez  Next visit date: 4/2/19 with Dr. Vazquez  Expected follow up: 8 weeks  MTM visit ([facility]) date: 4/29/19  UDT date: 10/15/18  Agreement date: 10/15/18   snipped in:    Red Flags/Comments identified on call: no  Pertinent between visit information about requested medication (telephone, mychart, prior authorization): patient called and wanted [facility]ill [facility] [place]  Script being sent to provider by nurse- dates and quantity:   Requested Prescriptions     Pending Prescriptions Disp Refills   ? morphine (MSIR) 15 MG tablet 84 tablet 0     Sig: Take 1 tablet (15 mg total) by mouth every 4 (four) hours as needed for pain.     Ph[facility] cued[facility] [place]  Standing orders for withdrawal protocol implemented: NA

## 2021-06-16 NOTE — TELEPHONE ENCOUNTER
Telephone Encounter by Melida Dickson RN at 5/7/2019  9:28 AM     Author: Melida Dickson RN Service: -- Author Type: Registered Nurse    Filed: 5/7/2019  9:36 AM Encounter Date: 5/7/2019 Status: Signed    : Melida Dickson RN (Registered Nurse)       Medication being requested: morphine 15 mg and dilaudid 4 mg  Last visit date: 4/2  Provider: BE  Plan from visit:  Continue Fentanyl 25 mcg , morphine 15 mg three times daily, hydromorphone 4 mg four times a day  Next visit date: 5/29.  Provider: BE  Expected follow up: 8 weeks  MTM visit ([facility]) date: na  UDT date: 10/24/2018  Agreement date: 10/15/2018   snipped in:    Red Flags/Comments identified on call:   Pertinent between visit information about requested medication (telephone, mychart, prior authorization): na  Script being sent to provider by nurse- dates and quantity:   Requested Prescriptions     Pending Prescriptions Disp Refills   ? HYDROmorphone (DILAUDID) 4 MG tablet 70 tablet 0     Sig: Take 1 tablet (4 mg total) by mouth every 6 (six) hours as needed for pain.   ? morphine (MSIR) 15 MG tablet 84 tablet 0     Sig: Take 1 tablet (15 mg total) by mouth 3 (three) times a day as needed for pain.     [facility]  Standing orders for withdrawal protocol implemented: NA

## 2021-06-16 NOTE — TELEPHONE ENCOUNTER
Telephone Encounter by Dalila Carrera RN at 1/3/2020  9:37 AM     Author: Dalila Carrera RN Service: -- Author Type: Registered Nurse    Filed: 1/3/2020  9:41 AM Encounter Date: 1/3/2020 Status: Signed    : Dalila Carrera RN (Registered Nurse)       Patient left a message requesting refill of hydromorphone.  Order was sent to pharmacy on 12/31/19 at last appointment.        Patient did not  Rx yet from 12/31.

## 2021-06-16 NOTE — TELEPHONE ENCOUNTER
Telephone Encounter by Rand Adler RN at 1/27/2020  3:40 PM     Author: Rand Adler RN Service: -- Author Type: Registered Nurse    Filed: 1/27/2020  3:56 PM Encounter Date: 1/27/2020 Status: Signed    : Rand Adler RN (Registered Nurse)       Medication being requested: Fentanyl  Last visit date: 12/31   Provider: BE  Next visit date: 3/4   Provider: BE  Expected follow up: 8 weeks  MTM visit ([facility]) date: na  UDT date: 11/2019  Agreement date: 11/2019   snipped in:      Pertinent between visit information about requested medication (telephone, mychart, prior authorization, concerns, comments): na  Script being sent to provider by nurse- dates and quantity:   Requested Prescriptions     Pending Prescriptions Disp Refills   ? fentaNYL (DURAGESIC) 25 mcg/hr 10 patch 0     Sig: Place 1 patch on the skin every third day.   [facility]  Standing orders for withdrawal protocol implemented: [facility]win

## 2021-06-16 NOTE — TELEPHONE ENCOUNTER
Telephone Encounter by Melida Dickson RN at 8/21/2019  1:31 PM     Author: Melida Dickson RN Service: -- Author Type: Registered Nurse    Filed: 8/21/2019  1:38 PM Encounter Date: 8/21/2019 Status: Signed    : Melida Dickson RN (Registered Nurse)       Medication being requested: fentanyl 25 mcg patch  Last visit date: 7/16  Provider: BE  Next visit date: 9/10.  Provider: BE  Expected follow up: 8 weeks  MTM visit ([facility]) date: no  UDS,CSA 10/2018   snipped in:    Pertinent between visit information about requested medication (telephone, mychart, prior authorization, concerns, comments):   Script being sent to provider by nurse- dates and quantity:   Requested Prescriptions     Pending Prescriptions Disp Refills   ? fentaNYL (DURAGESIC) 25 mcg/hr 10 patch 0     Sig: Place 1 patch on the skin every third day.     [facility]  Standing orders for withdrawal protocol implemented: NA

## 2021-06-16 NOTE — TELEPHONE ENCOUNTER
Telephone Encounter by Dalila Carrera RN at 1/20/2020 11:25 AM     Author: Dalila Carrera RN Service: -- Author Type: Registered Nurse    Filed: 1/20/2020 11:27 AM Encounter Date: 1/20/2020 Status: Signed    : Dalila Carrera RN (Registered Nurse)       Medication being requested: Dilaudid  Last visit date: 12/31/19.  Provider: BE  Next visit date: 3/4/20.  Provider: BE  Expected follow up: 8 weeks  MTM visit ([facility]) date: No  UDT date: 11/2019  Agreement date: 11/2019   snipped in:    Pertinent between visit information about requested medication (telephone, mychart, prior authorization, concerns, comments): No  Script being sent to provider by nurse- dates and quantity:   Requested Prescriptions     Pending Prescriptions Disp Refills   ? HYDROmorphone (DILAUDID) 4 MG tablet 84 tablet 0     Sig: Take 1 tablet (4 mg total) by mouth every 4 (four) hours as needed for pain.     [facility]  Standing orders for withdrawal protocol implemented: NA

## 2021-06-16 NOTE — TELEPHONE ENCOUNTER
Telephone Encounter by Melida Dickson RN at 6/18/2019  2:25 PM     Author: Melida Dickson RN Service: -- Author Type: Registered Nurse    Filed: 6/18/2019  2:30 PM Encounter Date: 6/18/2019 Status: Signed    : Melida Dickson RN (Registered Nurse)       Medication being requested: fentanyl 25 mcg patch  Last visit date: 5/29  Provider: BE  Next visit date: 7/16.  Provider: BE  Expected follow up: 8 weeks  MTM visit ([facility]) date: no  UDS,CSA 10/2018   snipped in:    Pertinent between visit information about requested medication (telephone, mychart, prior authorization, concerns, comments):   Script being sent to provider by nurse- dates and quantity:   Requested Prescriptions     Pending Prescriptions Disp Refills   ? fentaNYL (DURAGESIC) 25 mcg/hr 9 patch 0     Sig: Place 1 patch on the skin every third day.     [facility]  Standing orders for withdrawal protocol implemented: NA

## 2021-06-16 NOTE — TELEPHONE ENCOUNTER
Telephone Encounter by Rand Adler RN at 5/28/2019  9:04 AM     Author: Rand Adler RN Service: -- Author Type: Registered Nurse    Filed: 5/28/2019  9:19 AM Encounter Date: 5/28/2019 Status: Signed    : Rand Adler RN (Registered Nurse)       Medication being requested: Hydromorphone  Last visit date: 4/2  Provider: George  Next visit date: 5/28  Provider: George  Expected follow up: 8 weeks  MTM visit ([facility]) date: na  UDT date: 10/2018  Agreement date: 10/18   snipped in:      Pertinent between visit information about requested medication (telephone, mychart, prior authorization, concerns, comments): na  Script being sent to provider by nurse- dates and quantity:   Requested Prescriptions     Pending Prescriptions Disp Refills   ? HYDROmorphone (DILAUDID) 4 MG tablet 70 tablet 0     Sig: Take 1 tablet (4 mg total) by mouth every 6 (six) hours as needed for pain.     [facility] c[facility]  Standing orders for withdrawal protocol implemented: [facility]

## 2021-06-16 NOTE — TELEPHONE ENCOUNTER
Telephone Encounter by Althea Hoffman RN at 10/16/2019 10:16 AM     Author: Althea Hoffman RN Service: -- Author Type: Registered Nurse    Filed: 10/16/2019 10:24 AM Encounter Date: 10/16/2019 Status: Signed    : Althea Hoffman RN (Registered Nurse)       Medication being requested: Fentanyl, HYDROCODONE.  Last visit date: 9/10.  Provider: ronald  Next visit date: 11/5.  Provider: ronald  Expected follow up: 8 weeks  MTM visit ([facility]) date: no  UDT date: 10/2108  Agreement date: 10/2018   snipped in:    Pertinent between visit information about requested medication (telephone, mychart, prior authorization, concerns, comments): no  Script being sent to provider by nurse- dates and quantity:   Requested Prescriptions     Pending Prescriptions Disp Refills   ? fentaNYL (DURAGESIC) 25 mcg/hr 10 patch 0     Sig: Place 1 patch on the skin every third day.   ? HYDROmorphone (DILAUDID) 4 MG tablet 84 tablet 0     Sig: Take 1 tablet (4 mg total) by mouth every 4 (four) hours as needed for pain.     [facility]  Standing orders for withdrawal protocol implemented: na

## 2021-06-16 NOTE — TELEPHONE ENCOUNTER
Telephone Encounter by Melida Dickson RN at 2/4/2020 10:03 AM     Author: Melida Dickson RN Service: -- Author Type: Registered Nurse    Filed: 2/4/2020 10:16 AM Encounter Date: 2/4/2020 Status: Signed    : Melida Dickson RN (Registered Nurse)       Medication being requested: hydromorphone 4 mg  Last visit date: 12/31  Provider: BE  Next visit date: 03/04  Provider: BE  Expected follow up: 8 weeks  [facility] visit ([facility]) date: no  UDS/CSA 11/2019      Pertinent between visit information about requested medication (telephone, mychart, prior authorization, concerns, comments):   NA  Script being sent to provider by nurse- dates and quantity:   Requested Prescriptions     Pending Prescriptions Disp Refills   ? HYDROmorphone (DILAUDID) 4 MG tablet 84 tablet 0     Sig: Take 1 tablet (4 mg total) by mouth every 4 (four) hours as needed for pain.     [facility]armacy cued: [facility]  Standing orders for withdrawal protocol implemented: DELFINA

## 2021-06-16 NOTE — TELEPHONE ENCOUNTER
Telephone Encounter by Althea Hoffman RN at 8/26/2019  9:30 AM     Author: Althea Hoffman RN Service: -- Author Type: Registered Nurse    Filed: 8/26/2019  9:34 AM Encounter Date: 8/21/2019 Status: Signed    : Althea Hoffman RN (Registered Nurse)       Left message for refill of Hydromorphone.   Cued refill.    Per

## 2021-06-16 NOTE — TELEPHONE ENCOUNTER
Telephone Encounter by Melida Dickson RN at 5/17/2019  9:10 AM     Author: Melida Dickson RN Service: -- Author Type: Registered Nurse    Filed: 5/17/2019  9:17 AM Encounter Date: 5/17/2019 Status: Signed    : Melida Dickson RN (Registered Nurse)       Medication being requested: fentanyl 25 mcg  Last visit date: 4/2  Provider: BE  PLAN:    Continue Fentanyl 25 mcg , morphine 15 mg three times daily, hydromorphone 4 mg four times a day  Next visit date: 5/29.  Provider: BE  Expected follow up: 8 weeks  MTM visit ([facility]) date: no  UDS,CSA 10/2018   snipped in:    Pertinent between visit information about requested medication (telephone, mychart, prior authorization, concerns, comments):   Script being sent to provider by nurse- dates and quantity:   Requested Prescriptions     Pending Prescriptions Disp Refills   ? fentaNYL (DURAGESIC) 25 mcg/hr 9 patch 0     Sig: Place 1 patch on the skin every third day.     [facility]armacy cued: [facility]  Standing orders for withdrawal protocol implemented: NA

## 2021-06-16 NOTE — TELEPHONE ENCOUNTER
Telephone Encounter by Melida Dickson RN at 12/18/2019  1:47 PM     Author: Melida Dickson RN Service: -- Author Type: Registered Nurse    Filed: 12/18/2019  2:45 PM Encounter Date: 12/18/2019 Status: Addendum    : Melida Dickson RN (Registered Nurse)    Related Notes: Original Note by Melida Dickson RN (Registered Nurse) filed at 12/18/2019  1:47 PM       Refill request: hydromorphone 4 mg    Last visit date: 11/05 Provider: BE  Next visit date: 12/31  Provider: BE  Expected follow up: 8 weeks   MTM visit ([facility]) date: no  UDS/CSA 11/2019   snipped in:    Pertinent between visit information about requested medication (telephone, mychart, prior authorization, concerns, comments): NA  Script being sent to provider by nurse- dates and quantity:   Requested Prescriptions     Pending Prescriptions Disp Refills   ? HYDROmorphone (DILAUDID) 4 MG tablet 84 tablet 0     Sig: Take 1 tablet (4 mg total) by mouth every 4 (four) hours as needed for pain.     [facility]armacy cued: [facility]  Standing orders for withdrawal protocol implemented: NA

## 2021-06-17 NOTE — TELEPHONE ENCOUNTER
Telephone Encounter by Melida Dickson RN at 2020  3:49 PM     Author: Melida Dickson RN Service: -- Author Type: Registered Nurse    Filed: 2020  3:53 PM Encounter Date: 2020 Status: Signed    : Melida Dickson RN (Registered Nurse)       Medication being requested: hydromorphone 4 mg, effexor  mg, vitamin D3 2000 units, naloxone, lamictal 200 mg  Last visit date: 3/4  Provider: BE  Next visit date: .  Provider: BE  Expected follow up: 8 weeks  MTM visit ([facility]) date: no  UDS/CSA 2019   snipped in:  Pertinent between visit information about requested medication (telephone, mychart, prior authorization, concerns, comments): multiple refill requests  Script being sent to provider by nurse- dates and quantity:   Requested Prescriptions     Pending Prescriptions Disp Refills   ? HYDROmorphone (DILAUDID) 4 MG tablet 84 tablet 0     Sig: Take 1 tablet (4 mg total) by mouth every 4 (four) hours as needed for pain.   ? venlafaxine (EFFEXOR-XR) 150 MG 24 hr capsule 60 capsule 0     Sig: Take 2 capsules (300 mg total) by mouth daily.   ? cholecalciferol, vitamin D3, 2,000 unit Chew 60 tablet 10     Sig: Chew 1 capsule daily for 7 days, THEN 2 capsules daily. For one week, then two daily.   ? naloxone (NARCAN) 4 mg/actuation nasal spray 1 Box 0     Si spray (4 mg dose) into one nostril for opioid reversal. Call 911. May repeat if no response in 3 minutes.   ? lamoTRIgine (LAMICTAL) 200 MG tablet 30 tablet 2     Sig: Take 1 tablet (200 mg total) by mouth daily.     Pharmacy cued: [facility]  Standing orders for withdrawal protocol implemented:     Please review whether patient still to be taking effexor and lamictal, these are cued as well to ensure all refills are at the pharmacy during this time.

## 2021-06-17 NOTE — PROGRESS NOTES
"There have been several phone calls.  Lisa is concerned that her medications are not working as we have tried to adjust to help with low back pain.  She has been on fentanyl 25 and 12 mcg patches, and having tried hydrocodone, now more recent hydromorphone 2 mg 2 times a day.  She notes there is no breathing issues, has not had mood symptoms or sense of \"high\"  However has had significant problems with pain.  She notes it has on her back right side, its hard to twist.  She recalls 5 years ago seeing a neurosurgeon and told her she had diffuse degenerative changes.  Physical therapy and injections did not help.    She cannot afford the medical cannabis.    We discussed the breathing problems or concerns with opioids, she notes on fentanyl 75 mcg had been a concern.    She is decided she will pursue the pelvic floor surgery in the fall.    She notes stressors, including the youngest daughter lives with her having anxiety around somatic symptoms.  Her other daughter also has some concerns.  She does not feel she needs psychotherapy for support.    We discussed other agents.  She does not want to try methadone, having negative associations with heroin.    Reviews a trial of the ketamine made her feel \"high\".  Oxytocin has not been helpful.     reviewed as expected.      Current Outpatient Prescriptions:      buPROPion (WELLBUTRIN XL) 150 MG 24 hr tablet, Take 450 mg by mouth daily., Disp: , Rfl:      lamoTRIgine (LAMICTAL) 200 MG tablet, Take 200 mg by mouth., Disp: , Rfl:      QUEtiapine (SEROQUEL) 25 MG tablet, Take 25 mg by mouth at bedtime. Can take up to 3 a night, Disp: , Rfl:      traZODone (DESYREL) 300 MG tablet, TK 1 T PO QD HS, Disp: , Rfl: 2     venlafaxine (EFFEXOR-XR) 150 MG 24 hr capsule, TAKE 2 CAPSULES BY MOUTH EVERY DAY, Disp: 60 capsule, Rfl: 0     fentaNYL (DURAGESIC) 25 mcg/hr, Place 1 patch on the skin every third day., Disp: 10 patch, Rfl: 0     HYDROmorphone (DILAUDID) 4 MG tablet, Take 1 " "tablet (4 mg total) by mouth 4 (four) times a day., Disp: 56 tablet, Rfl: 0     medroxyPROGESTERone (PROVERA) 10 MG tablet, Take 1 tablet by mouth daily. Once daily for 12 days out of the month, Disp: , Rfl: 0     naloxone (NARCAN) 4 mg/actuation nasal spray, 1 spray (4 mg dose) into one nostril for opioid reversal. Call 911. May repeat if no response in 3 minutes., Disp: 1 Box, Rfl: 0  Blood pressure 107/67, pulse (!) 107, resp. rate 16, height 5' 6\" (1.676 m), weight 120 lb (54.4 kg), not currently breastfeeding.    Impression: Chronic pain relates a history of low back pain with radiculopathy, diffuse myofascial pain, now considering gynecologic surgery.  History of mesh placement.    Plan we reviewed goals to help with pain and function, concerned that she is noted higher dose of opioids with some respiratory difficulties.    We will decreased the fentanyl to 25 mcg, increase hydromorphone 4 mg 4 times a day.    Will provide Narcan, discussed its use.  She notes this may reassure her daughter.  She will call if there are questions.    Time spent 15 minutes face-to-face more than 50% count about above condition and coordination treatment plan  "

## 2021-06-17 NOTE — TELEPHONE ENCOUNTER
Telephone Encounter by Dalila Carrera RN at 6/9/2020  8:59 AM     Author: Dalila Carrera RN Service: -- Author Type: Registered Nurse    Filed: 6/9/2020  9:02 AM Encounter Date: 6/9/2020 Status: Signed    : Dalila Carrera RN (Registered Nurse)       Medication being requested: Hydromorphone 4 mg  Last visit date: 5/19/20.  Provider: BE  Next visit date: 7/15/20.  Provider: BE  Expected follow up: 8 weeks  MTM visit ([facility]) date: No  UDT date: 11/2019  Agreement date: 11/2019   snipped in:    Pertinent between visit information about requested medication (telephone, mychart, prior authorization, concerns, comments): No  Script being sent to provider by nurse- dates and quantity:   6/9/20-6/23/20, Qty: 84  Requested Prescriptions     Pending Prescriptions Disp Refills   ? HYDROmorphone (DILAUDID) 4 MG tablet 84 tablet 0     Sig: Take 1 tablet (4 mg total) by mouth every 4 (four) hours as needed for pain.     [facility]  Standing orders for withdrawal protocol implemented: NA

## 2021-06-17 NOTE — TELEPHONE ENCOUNTER
Telephone Encounter by Melida Dickson RN at 6/26/2020  1:53 PM     Author: Melida Dickson RN Service: -- Author Type: Registered Nurse    Filed: 6/26/2020  2:07 PM Encounter Date: 6/26/2020 Status: Signed    : Melida Dickson RN (Registered Nurse)       Medication being requested: hydromorphone 4 mg and fentanyl 25 mcg patch  Last visit date: 5/19  Provider: BE  Next visit date: 07/15  Provider: BE  Expected follow up: 8 weeks  MTM visit ([facility]) date: no  UDS/CSA 11/2019   snipped in:    Pertinent between visit information about requested medication (telephone, mychart, prior authorization, concerns, comments): NA  Script being sent to provider by nurse- dates and quantity:   Requested Prescriptions     Pending Prescriptions Disp Refills   ? HYDROmorphone (DILAUDID) 4 MG tablet 84 tablet 0     Sig: Take 1 tablet (4 mg total) by mouth every 4 (four) hours as needed for pain.   ? fentaNYL (DURAGESIC) 25 mcg/hr 10 patch 0     Sig: Place 1 patch on the skin every third day.     [facility] c[facility]  Standing orders for withdrawal protocol implemented: NA

## 2021-06-17 NOTE — TELEPHONE ENCOUNTER
Medication being requested: Fentanyl and Morphine  Last visit date: 3/3/21.  Provider: BE  Next visit date: 5/26/21.  Provider: BE  Expected follow up: 12 weeks  MTM visit ([facility]) date: No  UDT date: 12/2020  Agreement date: 12/2020   (Last fill date; name; strength; provider; MME; quantity):  03/28/2021 Fentanyl 25 Mcg/hr Patch Qty: 10 for 30 days [facility]  03/14/2021 Morphine Sulfate Ir 15 Mg Tab Qty: 20 for 5 days Br [facility]  Pertinent between visit information about requested medication (telephone, mychart, prior authorization, concerns, comments): No  Script being sent to provider by nurse- dates and quantity:   Requested Prescriptions     Pending Prescriptions Disp Refills     morphine (MSIR) 15 MG tablet 20 tablet 0     Sig: Take 1 tablet (15 mg total) by mouth every 6 (six) hours as needed for pain.     fentaNYL (DURAGESIC) 25 mcg/hr 10 patch 0     Sig: Place 1 patch on the skin every third day.     [facility] cued: [facility]  Standing orders for withdrawal protocol implemented: DELFINA

## 2021-06-18 NOTE — PROGRESS NOTES
"Lisa reviews sometimes doing better other worse.  She finds pain is exacerbated with the car ride, driving to [place] for 6 or 7 hours taking breaks.  Also recently flared up going to visit family in the [place].    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    She continues to have occasional vaginal bleeding, no rectal bleeding, plans to have a reevaluation in the fall about surgery but does not want to have this over the summer.    Has been using fentanyl 25 mcg patches, with hydromorphone 4 mg 0-4 times a day depending upon activity level.  Does not have any breathing problems.    Review of  as expected    Current Outpatient Prescriptions:      buPROPion (WELLBUTRIN XL) 150 MG 24 hr tablet, Take 450 mg by mouth daily., Disp: , Rfl:      HYDROmorphone (DILAUDID) 4 MG tablet, Take 1 tablet (4 mg total) by mouth 4 (four) times a day., Disp: 56 tablet, Rfl: 0     lamoTRIgine (LAMICTAL) 200 MG tablet, Take 200 mg by mouth., Disp: , Rfl:      naloxone (NARCAN) 4 mg/actuation nasal spray, 1 spray (4 mg dose) into one nostril for opioid reversal. Call 911. May repeat if no response in 3 minutes., Disp: 1 Box, Rfl: 0     QUEtiapine (SEROQUEL) 25 MG tablet, Take 25 mg by mouth at bedtime. Can take up to 3 a night, Disp: , Rfl:      traZODone (DESYREL) 300 MG tablet, TK 1 T PO QD HS, Disp: , Rfl: 2     venlafaxine (EFFEXOR-XR) 150 MG 24 hr capsule, TAKE 2 CAPSULES BY MOUTH EVERY DAY, Disp: 60 capsule, Rfl: 0     [START ON 7/7/2018] fentaNYL (DURAGESIC) 25 mcg/hr, Place 1 patch on the skin every third day., Disp: 10 patch, Rfl: 0     medroxyPROGESTERone (PROVERA) 10 MG tablet, Take 1 tablet by mouth daily. Once daily for 12 days out of the month, Disp: , Rfl: 0  Blood pressure 93/62, pulse 93, resp. rate 16, height 5' 6\" (1.676 m), weight 120 lb (54.4 kg), not currently breastfeeding.    Pain  score 7.  She is alert with a clear sensorium good eye contact thought process tight " logical.    Impression: chronic pain relates to history of low back pain with radiculopathy diffuse myofascial pain and no gynecologic concerns with problems related to past mesh placement.    Plan she will continue with opioids as above.  She will clarify when and if she is going to have the surgery.    Time spent more than 15 minutes face-to-face 50% count about above condition coordination treatment plan

## 2021-06-21 NOTE — PROGRESS NOTES
[facility] Certification Request    November 15, 2018      Patient: Lisa Burr  MR Number: [id number]  YOB: 1960  Date of Visit: 11/15/2018      Dear :    Thank you for this referral.   We are seeing Lisa Burr for Physical Therapy of Abdominal pain, epigastric.    Medicare and/or Medicaid requires physician review and approval of the treatment plan. Please review the plan of care and verify that you agree with the therapy plan of care by co-signing this note.      Plan of Care  Authorization / Certification Start Date: 11/15/18  Authorization / Certification End Date: 02/13/19  Authorization / Certification Number of Visits: medicare  Communication with: Referral Source  Patient Related Instruction: Nature of Condition;Treatment plan and rationale;Self Care instruction;Basis of treatment;Next steps  Times per Week: 1-2  Number of Weeks: 6-12  Number of Visits: up to 12  Discharge Planning: self management  Therapeutic Exercise: ROM;Stretching;Strengthening  Neuromuscular Reeducation: posture;core;TNE  Manual Therapy: myofascial release;strain counterstrain      Goals:  Pt. will demonstrate/verbalize independence in self-management of condition in : 12 weeks  Pt. will be independent with home exercise program in : 12 weeks  Pt. will have improved quality of sleep: waking less times/night;in 12 weeks;Comment  Comment:: waking 2-3x /night  Pt. will be able to walk : 20 minutes;around the house;with less pain;with less difficulty;for household mobility;for community mobility;in 12 weeks  Patient will stand : 30 minutes;with less pain;with less difficultty;for home chores;in 12 weeks    No Data Recorded      If you have any questions or concerns, please don't hesitate to call.    Sincerely,      Lennie Pressley, PT        Physician recommendation:     ___ Follow therapist's recommendation        ___ Modify therapy      *Physician co-signature indicates they certify the need  for these services furnished within this plan and while under their care.        [facility]   Visceral Initial Evaluation    Patient Name: Lisa Burr  Date of evaluation: 11/15/2018   Visit #1  Referral Diagnosis: Abdominal pain, epigastric  Referring provider: Tyler Vazquez*  Visit Diagnosis:     ICD-10-CM    1. Chronic Pain G89.29    2. Pelvic Pain N94.9    3. Bilateral sciatica M54.31     M54.32    4. Lumbar Spondylosis M47.817    5. Bipolar Disorder F30.9    6. Generalized anxiety disorder F41.1        Assessment:       Lisa Burr is a 58 y.o. female who presents to therapy today with chief complaints of low back pain, leg pain, abdominal/pelvic pain, neck pain. Onset date of sx was 30 years ago, worsening symptoms for 1 year.  Functional impairments include sitting, standing, walking, sleeping, daily activities, personal cares.  Clinical findings include poor tolerance of any prolonged positioning, severely limited trunk ROM, fascial restrictions of abdominal visceral structures, decreased LE/hip ROM with pain on the (L), posture deficits and structural asymmetries, multiple tender points in low back, pelvis and lower extremities.       Pt. is appropriate for skilled PT intervention as outlined in the Plan of Care (POC).    Goals:  Pt. will demonstrate/verbalize independence in self-management of condition in : 12 weeks  Pt. will be independent with home exercise program in : 12 weeks  Pt. will have improved quality of sleep: waking less times/night;in 12 weeks;Comment  Comment:: waking 2-3x /night  Pt. will be able to walk : 20 minutes;around the house;with less pain;with less difficulty;for household mobility;for community mobility;in 12 weeks  Patient will stand : 30 minutes;with less pain;with less difficultty;for home chores;in 12 weeks    No Data Recorded    Patient's expectations/goals are realistic.    Chronicity of the problem.       Plan / Patient Instructions:     "    Plan of Care:   Authorization / Certification Start Date: 11/15/18  Authorization / Certification End Date: 19  Authorization / Certification Number of Visits: medicare  Communication with: Referral Source  Patient Related Instruction: Nature of Condition;Treatment plan and rationale;Self Care instruction;Basis of treatment;Next steps  Times per Week: 1-2  Number of Weeks: 6-12  Number of Visits: up to 12  Discharge Planning: self management  Therapeutic Exercise: ROM;Stretching;Strengthening  Neuromuscular Reeducation: posture;core;TNE  Manual Therapy: myofascial release;strain counterstrain      Plan for next visit: patient education, manual therapy, initiate home program.      Subjective:         Social information:   Living Situation:single family home   Occupation:on permanent disability   Work Status:NA   Equipment Available: None    History of Present Illness:    Lisa is a 58 y.o. female who presents to therapy today with complaints of wide spread pain. Low back pain, abdominal/pelvic pain and cramping, (B) leg pain, (L) foot pain, neck pain. Reports urinary and fecal incontinence. Date of onset/duration of symptoms is 30 year ago, back pain. Hx of herniated disc, DDD. \"A lot worse\" in the past year. Onset was gradual, insidious. Symptoms are getting worse. She reports  A chronic  history of similar symptoms. She describes their previous level of function as not limited  Hx of failed bladder repair with mesh 8 years ago. Symptoms were worse after the surgery. She reports the mesh is eroding into the vagina, rectum. Had injections years ago without benefit. Hx of other abdominal surgeries - appendectomy, fibroid removal.   Previous treatment includes medication. Had some PT about 3-4 years ago. Trial of pool therapy. Had recent consult at [place], not a surgical candidate.      Pain Ratin  Pain rating at best: 6  Pain rating at worst: 10  Pain description: pain    Functional limitations are " described as occurring with:   bending  bowel/bladder conrol  lifting  performing routine daily activities  sleeping  standing 5-10 min  transitional movements getting in  bed, chair and car and getting out of  bed, chair and car  walking 5-10 min   Household tasks    Patient reports benefit from:  pain medication    Past Medical History:   Diagnosis Date     Anemia      Anxiety      Back pain     low back and neck     Depression      GERD (gastroesophageal reflux disease)     states takes Tums 2-3 times/week     Irritable bowel syndrome (IBS)      Postmenopausal bleeding      Scoliosis      Past Surgical History:   Procedure Laterality Date     BLADDER REPAIR      with mesh     COMBINED HYSTEROSCOPY DIAGNOSTIC / D&C N/A 10/9/2015    Procedure: DILATION AND CURETTAGE WITH HYSTEROSCOPY;  Surgeon: Gato Sepulveda MD;  Location: [facility];  Service:      LA MYOMECTOMY 1-4,W/TOT 250GMS/<,ABD APPRCH      Description: Uterine Myomectomy;  Recorded: 09/20/2011;     LA REMOVE TONSILS/ADENOIDS,<11 Y/O      Description: Tonsillectomy With Adenoidectomy;  Recorded: 09/20/2011;     LA TARSAL TUNNEL RELEASE      Description: Neuroplasty Decompression Tarsal Tunnel Release;  Recorded: 10/29/2011;  Annotations: Left sided     SHOULDER SURGERY Right      Patient Active Problem List   Diagnosis     Sciatica     Pelvic Pain     Midback Pain     Chronic Pain     Cervicalgia     Opioid Dependence With Continuous Use     Arthropathy Of The Ankle / Foot     Cervical Spondylosis     Lumbar Spondylosis     Bipolar Disorder     Generalized Anxiety Disorder     Drug Dependence     Bulimia Nervosa     Attention Deficit Disorder Without Hyperactivity     Anemia     Drug-induced constipation          Objective:        Precautions/Restrictions: None  Involved side: Bilateral  Posture Observation:   Decreased lumbar lordosis  Scoliosis,   (R) crest elevated, (R) PSIS post in standing      Lumbar ROM:  Date:      *Indicate scale AROM  AROM AROM   Lumbar Flexion 10-15% (+)     Lumbar Extension 10-15% (+)      Right Left Right Left Right Left   Lumbar Sidebending 15-20% (+) 15-20% (+)       Lumbar Rotation         Thoracic Flexion      Thoracic Extension      Thoracic Sidebending         Thoracic Rotation           Lumbar Special Tests:  Lumbar Special Tests Right Left SI Tests Right  Left   Quadrant test   SI Compression     Straight leg raise 57 45 (+) SI Distraction     Crossover response   POSH Test     Slump   Sacral Thrust     Sit-up test  FADIR     Trunk extensor endurance test  Resisted Abduction     Prone instability test  Other:     Pubic shotgun  Other:       LE Screen/flexibility:  Hip IR  (R) WFL     (L) mod loss (+)  Hip ER (R) WFL    (L) WFL (+)    Palpation:  Tenderness of (B) lumbar medial paraspinals, L>R SI joints, (L) post/lat and post/med thigh/femur, (L) ant/med tibia, L>R dorsal/plantar metatarsals,     Sphincters  DJ Junction: Motility moderately decreased  Pylorus: Motility WNL  Oddi: Motility WNL  Ileocecal Valve: Motility moderately decreased    Abdomen  Stomach: Motility moderately decreased   Duodenum: Motility moderately decreased   Colon: motility moderately decreased  Cecum: Motility moderately decreased   Small intestine: Motility moderately decreased   Sigmoid colon: Motility moderately decreased     Pelvis  Bladder: Motility moderately decreased   Uterus: Motility moderately decreased       Treatment Today     TREATMENT MINUTES COMMENTS   Evaluation 45    Self-care/ Home management     Manual therapy     Neuromuscular Re-education     Therapeutic Activity     Therapeutic Exercises 8 Plan of care and goals developed in collaboration with patient.   Discussed findings, anatomy, treatment concepts with MFR.   Gait training     Modality__________________                Total 53    Blank areas are intentional and mean the treatment did not include these items.       PT Evaluation Code: (Please list factors)  Patient  History/Comorbidities: bipolar, anxiety, pelvic mesh,   Examination: 3  Clinical Presentation: evolving  Clinical Decision Making: mod    Patient History/  Comorbidities Examination  (body structures and functions, activity limitations, and/or participation restrictions) Clinical Presentation Clinical Decision Making (Complexity)   No documented Comorbidities or personal factors 1-2 Elements Stable and/or uncomplicated Low   1-2 documented comorbidities or personal factor 3 Elements Evolving clinical presentation with changing characteristics Moderate   3-4 documented comorbidities or personal factors 4 or more Unstable and unpredictable High              Lennie Pressley  11/15/2018  1:31 PM

## 2021-06-21 NOTE — PROGRESS NOTES
Reviews going to the [facility] with her family.  They had an MRI, met with surgeons.  Surgeons recommendation was not to do surgery.  The reviewed the mesh in a pelvic floor would be very difficult to do surgery.  They could not guarantee the pain would resolve, and whether the colostomy could be taken down.  They told her they did not expect the pain will get any worse that it is presently.    She notes the pain has become a problem radiating her low back down her right leg.    Has had constipation which sometimes seems to make this worse.  Her insurance did not cover Movantik.  I sent in a prescription for Relistor.  She was not aware of that.    She continues with the fentanyl 25 mcg daily and hydromorphone 4 mg 5 times a day.  Pain continues also in her foot and neck.    Reviewing injections in her back have not ever really been helpful.  Back pain was thought to possibly referred from the colorectal surgery.    She continues using lamotrigine 200 mg daily, Seroquel 50 mg at night, Effexor Wellbutrin and trazodone from her psychiatrist.    I discussed myofascial release, she thinks she may have done something like that several years ago at [place].  She notes she is now 10 years into sobriety, does not want to look into issues such as medical cannabis.  Previously tried ketamine and was uncomfortable with that.      Current Outpatient Prescriptions:      buPROPion (WELLBUTRIN XL) 150 MG 24 hr tablet, Take 450 mg by mouth daily., Disp: , Rfl:      lamoTRIgine (LAMICTAL) 200 MG tablet, Take 200 mg by mouth., Disp: , Rfl:      naloxone (NARCAN) 4 mg/actuation nasal spray, 1 spray (4 mg dose) into one nostril for opioid reversal. Call 911. May repeat if no response in 3 minutes., Disp: 1 Box, Rfl: 0     QUEtiapine (SEROQUEL) 25 MG tablet, Take 25 mg by mouth at bedtime. Can take up to 3 a night, Disp: , Rfl:      traZODone (DESYREL) 300 MG tablet, TK 1 T PO QD HS, Disp: , Rfl: 2     venlafaxine (EFFEXOR-XR) 150  "MG 24 hr capsule, TAKE 2 CAPSULES BY MOUTH EVERY DAY, Disp: 60 capsule, Rfl: 0     clindamycin (CLEOCIN) 300 MG capsule, One and a half tabs (450 mg) by mouth 3 times a day for 5 days, Disp: 23 capsule, Rfl: 0     docusate sodium (COLACE) 100 MG capsule, Take 1 capsule (100 mg total) by mouth 2 (two) times a day., Disp: 60 capsule, Rfl: 2     fentaNYL (DURAGESIC) 25 mcg/hr, Place 1 patch on the skin every third day., Disp: 10 patch, Rfl: 0     HYDROmorphone (DILAUDID) 4 MG tablet, Take 1 tablet (4 mg total) by mouth 5 (five) times a day for 14 days., Disp: 70 tablet, Rfl: 0     medroxyPROGESTERone (PROVERA) 10 MG tablet, Take 1 tablet by mouth daily. Once daily for 12 days out of the month, Disp: , Rfl: 0     methylnaltrexone (RELISTOR) 150 mg Tab, Take 450 mg by mouth daily., Disp: 90 tablet, Rfl: 2  Blood pressure 114/66, pulse 91, resp. rate 16, height 5' 6\" (1.676 m), weight 120 lb (54.4 kg), not currently breastfeeding.    Score 8.  Alert clear sensorium appropriately groomed.  Affect is constricted.  Indeed appears uncomfortable.    Impression back pain includes low back pain with radiculopathy, diffuse myofascial pain, and previous colorectal surgery with mesh placement.    Plan discussed the use of Relistor for opiate-induced constipation, using Colace and bulking agents as well.    After this response intervention then we discussed looking again into visceral myofascial release to see what element of the back pain is referred pelvic floor pain.    We have been cautious about increasing opioids given previous concerns of subjective respiratory distress.    Time spent 25 minutes face-to-face more than 50% count about above condition and coordination treatment plan  "

## 2021-06-22 NOTE — PROGRESS NOTES
"[facility] Daily Progress     Patient Name: Lisa Burr  Date: 12/10/2018  Visit #4 ([facility] cert to 19)  Referral Diagnosis: Abdominal pain, epigastric  Referring provider: Tyler Vazquez*  Visit Diagnosis:     ICD-10-CM    1. Chronic Pain G89.29    2. Pelvic Pain N94.9    3. Bilateral sciatica M54.31     M54.32    4. Lumbar Spondylosis M47.817    5. Bipolar Disorder F30.9    6. Generalized anxiety disorder F41.1    7. History of pelvic surgery Z98.890          Assessment:     Patient is appropriate to continue with skilled physical therapy intervention, as indicated by initial plan of care.    Goal Status:  Pt. will demonstrate/verbalize independence in self-management of condition in : 12 weeks  Pt. will be independent with home exercise program in : 12 weeks  Pt. will have improved quality of sleep: waking less times/night;in 12 weeks;Comment  Comment:: waking 2-3x /night  Pt. will be able to walk : 20 minutes;around the house;with less pain;with less difficulty;for household mobility;for community mobility;in 12 weeks  Patient will stand : 30 minutes;with less pain;with less difficultty;for home chores;in 12 weeks    No Data Recorded    Plan / Patient Education:     Continue with initial plan of care.   Continue to incorporate TNE education.    Subjective:     Pain Ratin=8  Missed last appointment due to fecal incontinence, didn't feel she could leave the house. Had severe pain yesterday, called paramedics as she was doubled over in pain. She had a bowel movement and symptoms subsided. Had follow up with pain clinic today. Has chronic constipation due to medications.   Bladder is \"not bad\". She leaks a little bit, small amounts.  Pain in (L) LB to buttock. Usually pain is on the (R), but reports laterality is variable.      Objective:      12 sec/cycle.   GL (+) for cranium.   Cranial scan is (+) for (L) lumbar LF.     improved to 6 sec/cycle after treatment. Patient reports " significant reduction in abdominal pain after treatment.     Treatment Today     TREATMENT MINUTES COMMENTS   Evaluation     Self-care/ Home management 10 Discussed TNE concepts, 2 hour voiding schedule to minimize urinary leakage.   Manual therapy 45 Induction, indirect, direct techniques utilized as appropriate for optimal tissue release.   MFR/SCS - (L) LFL2-3-MS, colon motility, asc colon, hepatic flexure, post/(L) cranium, respiratory diaphragm, mediastinum, still pt/CV4   Neuromuscular Re-education     Therapeutic Activity     Therapeutic Exercises     Gait training     Modality__________________                Total 55    Blank areas are intentional and mean the treatment did not include these items.       Lennie Pressley  12/10/2018

## 2021-06-22 NOTE — PROGRESS NOTES
[facility] Daily Progress     Patient Name: Lisa Burr  Date: 2018  Visit #7 ([facility] cert to 19)  Referral Diagnosis: Abdominal pain, epigastric  Referring provider: Tyler Vazquez*  Visit Diagnosis:     ICD-10-CM    1. Chronic Pain G89.29    2. Pelvic Pain N94.9    3. Bilateral sciatica M54.31     M54.32    4. Lumbar Spondylosis M47.817    5. Bipolar Disorder F30.9    6. Generalized anxiety disorder F41.1    7. History of pelvic surgery Z98.890          Assessment:     Patient is appropriate to continue with skilled physical therapy intervention, as indicated by initial plan of care.    Goal Status:  Pt. will demonstrate/verbalize independence in self-management of condition in : 12 weeks;Progressing toward  Pt. will be independent with home exercise program in : 12 weeks;Progressing toward  Pt. will have improved quality of sleep: waking less times/night;in 12 weeks;Comment  Comment:: waking 2-3x /night   status: sleep pattern variable, wakes about 3x/night  Pt. will be able to walk : 20 minutes;around the house;with less pain;with less difficulty;for household mobility;for community mobility;in 12 weeks;Comment;Not Met  Comment:: status: about 10-15 min when shopping  Patient will stand : 30 minutes;with less pain;with less difficultty;for home chores;in 12 weeks;Comment;Not Met  Comment:: status: tolerance is variable, up to 15 min    No Data Recorded    Plan / Patient Education:     Continue with initial plan of care.  Progress with home program as tolerated.   Add entry level strengthening exercises next visit.   Will have to reduce PT to 1x/wk in 2019 due to out of pocket expenses.     Subjective:     Pain Ratin  Pain level has been variable. (L) hip pain this morning. A little better after taking pain meds. Still having some stool leakage, some blood in urine, abdominal pain. Seeing Dr. Vazquez next week.       Objective:     Mod fascial restrictions with associated  tenderness noted in (R) post femur, hamstrings, colon, sigmoid,     Treatment Today     TREATMENT MINUTES COMMENTS   Evaluation     Self-care/ Home management     Manual therapy 55 Induction, indirect, direct techniques utilized as appropriate for optimal tissue release.   MFR/SCS - (R) LFL3-4-MS, (R) PLLL4-5-MS, (R) lumbar plexus, (R) KI-V, (R) kidney, desc colon, SIGM-V, sigmoid, (R) L3-5F (P), uterus, bladder, pelvic diaphragm   Neuromuscular Re-educationg     Therapeutic Activity     Therapeutic Exercises     Gait training     Modality__________________                Total 55    Blank areas are intentional and mean the treatment did not include these items.       Lennie Pressley  12/27/2018  [facility] Discharge Summary  Patient Name: Lisa Burr  Date: 2/6/2019  Referral Diagnosis: Abdominal pain, epigastric  Referring provider: Tyler Vazquez*  Visit Diagnosis:   1. Chronic Pain     2. Pelvic Pain     3. Bilateral sciatica     4. Lumbar Spondylosis     5. Bipolar Disorder     6. Generalized anxiety disorder     7. History of pelvic surgery         Goals:  Pt. will demonstrate/verbalize independence in self-management of condition in : 12 weeks;Progressing toward  Pt. will be independent with home exercise program in : 12 weeks;Progressing toward  Pt. will have improved quality of sleep: waking less times/night;in 12 weeks;Comment  Comment:: waking 2-3x /night   status: sleep pattern variable, wakes about 3x/night  Pt. will be able to walk : 20 minutes;around the house;with less pain;with less difficulty;for household mobility;for community mobility;in 12 weeks;Comment;Not Met  Comment:: status: about 10-15 min when shopping  Patient will stand : 30 minutes;with less pain;with less difficultty;for home chores;in 12 weeks;Comment;Not Met  Comment:: status: tolerance is variable, up to 15 min    No Data Recorded    Patient was seen for 7 visits from 11/15/18 to 12/27/18.  The patient  attended therapy initially, but did not finish the therapy sessions prescribed.  Goals were not fully achieved. Explanation for goals not achieved: did not complete plan of care.   The patient discontinued therapy, did not return.   Patient reported she would have limit PT visits in 2019 due to increased out of pocket cost. She has not returned for follow up since December 2018.    Therapy will be discontinued at this time.  The patient will need a new referral to resume.    Thank you for your referral.  Lennie Pressley  2/6/2019  10:06 AM

## 2021-06-22 NOTE — PROGRESS NOTES
Lisa had called in early November having a flareup of back pain.  We added in some morphine at that time.    She describes yesterday she had to call the paramedics.  She was having significant abdominal pain.  Just before she was passing a bowel movement.  She did pass it and felt better.  This did happen once in the past before.  She attributed in part to the mesh and adhesions with her colon.  She had smaller bowel movements in the two  previous days.    Reviewed the insurance did not cover the Relistor nor the Movantik.    She did find adding in morphine 15 mg 3 times a day, it made her tired, she tends to take it more than once or more like once or twice a day at the end of the day and is continued with the hydromorphone 4 mg 5 times a day.    The fentanyl continues with a 25 and a 12 mcg patch.  She is reluctant about increasing the fentanyl back to 50 mcg as that had trouble breathing.  On this regimen and it appears to have some coverage.    She has been doing physical therapy again with Lennie doing some myofascial release.  She notes the first session was quite uncomfortable, we will see her again today.    We reviewed augmenting strategies, amantadine and led to dry mouth, Namenda made her fatigued.  She did not like the feeling of ketamine giving her past history of substance use.  She is taking lamotrigine 200 mg daily for mood symptoms from her psychiatrist Dr. Jonas.      Current Outpatient Medications:      buPROPion (WELLBUTRIN XL) 150 MG 24 hr tablet, Take 450 mg by mouth daily., Disp: , Rfl:      lamoTRIgine (LAMICTAL) 200 MG tablet, Take 200 mg by mouth., Disp: , Rfl:      naloxone (NARCAN) 4 mg/actuation nasal spray, 1 spray (4 mg dose) into one nostril for opioid reversal. Call 911. May repeat if no response in 3 minutes., Disp: 1 Box, Rfl: 0     QUEtiapine (SEROQUEL) 25 MG tablet, Take 25 mg by mouth at bedtime. Can take up to 3 a night, Disp: , Rfl:      traZODone (DESYREL) 300 MG tablet,  "TK 1 T PO QD HS, Disp: , Rfl: 2     venlafaxine (EFFEXOR-XR) 150 MG 24 hr capsule, TAKE 2 CAPSULES BY MOUTH EVERY DAY, Disp: 60 capsule, Rfl: 0     clindamycin (CLEOCIN) 300 MG capsule, One and a half tabs (450 mg) by mouth 3 times a day for 5 days, Disp: 23 capsule, Rfl: 0     docusate sodium (COLACE) 100 MG capsule, Take 1 capsule (100 mg total) by mouth 2 (two) times a day., Disp: 60 capsule, Rfl: 2     fentaNYL (DURAGESIC) 12 mcg/hr, Place 1 patch on the skin every third day for 10 days., Disp: 10 patch, Rfl: 0     fentaNYL (DURAGESIC) 25 mcg/hr, Place 1 patch on the skin every third day for 10 days., Disp: 30 patch, Rfl: 0     [START ON 12/18/2018] HYDROmorphone (DILAUDID) 4 MG tablet, Take 1 tablet (4 mg total) by mouth 5 (five) times a day for 14 days., Disp: 70 tablet, Rfl: 0     medroxyPROGESTERone (PROVERA) 10 MG tablet, Take 1 tablet by mouth daily. Once daily for 12 days out of the month, Disp: , Rfl: 0     morphine (MSIR) 15 MG tablet, Take 1 tablet (15 mg total) by mouth 2 (two) times a day for 7 days., Disp: 56 tablet, Rfl: 0  Blood pressure 109/69, pulse 98, resp. rate 16, height 5' 6\" (1.676 m), weight 120 lb (54.4 kg), not currently breastfeeding.    Score 8.  She is alert with a clear sensorium.  Dressed in her [employer] closed for the game tonight.  She is a \"effusive\" fan.  constrictedt range of affect.    Assessment: Chronic pain includes a history of low back pain with radiculopathy, diffuse myofascial pain, history of colorectal surgeon with mesh placement.    Plan: We will look again to the use of Relistor or Movantik with insurance cards to see if her secondary coverage may make her eligible, reviewed using other bulking agents have not been effective.    We will continue the opioids as above is it is a complicated regimen, concern for respiratory suppression.    She will continue with physical therapy.    I will contact her psychiatrist to discuss increasing the lamotrigine to see if this " may help with elements of central sensitization and augmenting the opioids.  Will sign a release of information.    Time spent more than 15 minutes face-to-face 50% count about above condition and coordination treatment plan

## 2021-06-22 NOTE — PROGRESS NOTES
[facility] Daily Progress     Patient Name: Lisa Burr  Date: 2018  Visit #6 (MC cert to 19)  Referral Diagnosis: Abdominal pain, epigastric  Referring provider: Tyler Vazquez*  Visit Diagnosis:     ICD-10-CM    1. Chronic Pain G89.29    2. Pelvic Pain N94.9    3. Bilateral sciatica M54.31     M54.32    4. Lumbar Spondylosis M47.817    5. Bipolar Disorder F30.9    6. Generalized anxiety disorder F41.1    7. History of pelvic surgery Z98.890          Assessment:     Patient is appropriate to continue with skilled physical therapy intervention, as indicated by initial plan of care.    Goal Status:  Pt. will demonstrate/verbalize independence in self-management of condition in : 12 weeks  Pt. will be independent with home exercise program in : 12 weeks  Pt. will have improved quality of sleep: waking less times/night;in 12 weeks;Comment  Comment:: waking 2-3x /night  Pt. will be able to walk : 20 minutes;around the house;with less pain;with less difficulty;for household mobility;for community mobility;in 12 weeks  Patient will stand : 30 minutes;with less pain;with less difficultty;for home chores;in 12 weeks    No Data Recorded    Plan / Patient Education:     Continue with initial plan of care.  Progress with home program as tolerated.    Subjective:     Pain Ratin  Doing okay today. Took a laxative on Sat morning, had stool leakage all night. Yesterday was okay. Back pain laterality has been variable. Had a normal BM today.       Objective:     CRI/ is 16-18 sec/cycle  Amplitude is  poor.  Quality is  poor.   LL (+) for (L) lower quadrant.   Fascial restrictions noted desc colon, sigmoid, mesenteric root.     Treatment Today     TREATMENT MINUTES COMMENTS   Evaluation     Self-care/ Home management     Manual therapy 55 Induction, indirect, direct techniques utilized as appropriate for optimal tissue release.   MFR/SCS - still pt/CV4, frontal lift/frontal occipital hold,  finger in ear, desc colon, sigmoid, (B) MR-V, sm intestine, pelvic diaphragm, supine LS traction    Neuromuscular Re-education     Therapeutic Activity     Therapeutic Exercises     Gait training     Modality__________________                Total 55    Blank areas are intentional and mean the treatment did not include these items.       Lennie Pressley  12/17/2018

## 2021-06-22 NOTE — PROGRESS NOTES
"[facility] Daily Progress     Patient Name: Lisa Burr  Date: 2018  Visit #2  Referral Diagnosis: Abdominal pain, epigastric  Referring provider: Tyler Vazquez*  Visit Diagnosis:     ICD-10-CM    1. Chronic Pain G89.29    2. Pelvic Pain N94.9    3. Bilateral sciatica M54.31     M54.32    4. Lumbar Spondylosis M47.817    5. Bipolar Disorder F30.9    6. Generalized anxiety disorder F41.1    7. History of pelvic surgery Z98.890          Assessment:     Patient is appropriate to continue with skilled physical therapy intervention, as indicated by initial plan of care.    Goal Status:  Pt. will demonstrate/verbalize independence in self-management of condition in : 12 weeks  Pt. will be independent with home exercise program in : 12 weeks  Pt. will have improved quality of sleep: waking less times/night;in 12 weeks;Comment  Comment:: waking 2-3x /night  Pt. will be able to walk : 20 minutes;around the house;with less pain;with less difficulty;for household mobility;for community mobility;in 12 weeks  Patient will stand : 30 minutes;with less pain;with less difficultty;for home chores;in 12 weeks    No Data Recorded    Plan / Patient Education:     Continue with initial plan of care.    Subjective:     Pain Ratin  Having \"quite a bit of pain\", not sleeping well. Meds are helping some with sleep. Thinks she's had some blood in her stools. (R) LBP is main complaint today. Incontinence is \"there\". More urinary leakage in the evening, sometimes with urge.     Objective:     Mult tender points in (R) lumbar spine, (R) post/med, post/lat femur, (R) ant/med tibia, R>L dorsal/plantar foot     Treatment Today     TREATMENT MINUTES COMMENTS   Evaluation     Self-care/ Home management     Manual therapy 55 Induction, indirect, direct techniques utilized as appropriate for optimal tissue release.   MFR/SCS - (R) LFL3-5-MS, (R) ALLL4-5-MS, (R) PLLL3-5-MS, (R) [id number]-R, (B) MTAR (P)/mult pts, " (R) L2-3F (P), supine LS traction, pelvic diaphragm, (R) PLAN2-4(P),    Neuromuscular Re-education     Therapeutic Activity     Therapeutic Exercises     Gait training     Modality__________________                Total 55    Blank areas are intentional and mean the treatment did not include these items.       Lennie Pressley  11/29/2018

## 2021-06-22 NOTE — TELEPHONE ENCOUNTER
"Patient calls to report that she has multiple fentanyl 25 mcg patches but no 12 mcg patches.  Patient states \" I am not sure what happened\"  Review of medications and patient did receive a 90 day supply of fentanyl 25 mcg patches.  12/10/2018 112/10/2018 Fentanyl 25 Mcg/Hr Patch 30 90 Br Keerthi [id number] [facility] (9000) 0 60.00 [facility] [employer] [place]   Please indicate if wanting patient to continue with 25 mcg patches only or to order additional 12 mcg patches, she now has quite a supply of 25 mcg patches.    Patient also reports that she has always taken morphine three times a day, she just continued taking three times daily.  At last office visit patient reported morphine making her tired, that she was taking only once or twice daily at the end of the day.  She then realized that the bottle says twice daily so not will be out early.  Review of last office visit and instructions for use, will continue with current RX as is, this will be due on 01/07.  (12/10/2018 1 12/10/2018 Morphine Sulfate Ir 15 MG Tab 56 28 Br Keerthi [id number] [facility] (8602) 0 30.00 [place] [place])    Patient requests a refill of hydromorphone at this time as well.  (12/18/2018 1 12/10/2018 Hydromorphone 4 MG Tablet 70 14 Br Keerthi [id number] [facility] (4875) 0 80.00 [place] )  Will que refill of hydromorphone as this appears to be due at this time  "

## 2021-06-22 NOTE — PROGRESS NOTES
"[facility] Daily Progress     Patient Name: Lisa Burr  Date: 2018  Visit #5 (MC cert to 19)  Referral Diagnosis: Abdominal pain, epigastric  Referring provider: Tyler Vazquez*  Visit Diagnosis:     ICD-10-CM    1. Chronic Pain G89.29    2. Pelvic Pain N94.9    3. Bilateral sciatica M54.31     M54.32    4. Lumbar Spondylosis M47.817    5. Bipolar Disorder F30.9    6. Generalized anxiety disorder F41.1    7. History of pelvic surgery Z98.890          Assessment:     Patient is appropriate to continue with skilled physical therapy intervention, as indicated by initial plan of care.    Goal Status:  Pt. will demonstrate/verbalize independence in self-management of condition in : 12 weeks  Pt. will be independent with home exercise program in : 12 weeks  Pt. will have improved quality of sleep: waking less times/night;in 12 weeks;Comment  Comment:: waking 2-3x /night  Pt. will be able to walk : 20 minutes;around the house;with less pain;with less difficulty;for household mobility;for community mobility;in 12 weeks  Patient will stand : 30 minutes;with less pain;with less difficultty;for home chores;in 12 weeks    No Data Recorded    Plan / Patient Education:     Continue with initial plan of care.  Progress with home program as tolerated.    Subjective:     Pain Ratin-8  Low back and stomach is hurting. Hasn't had a BM since . Bladder function is about the same, states \"I leak a little bit\".       Objective:     Fascial restrictions of cecum, colon, duodenum, sphincters, sigmoid.    Treatment Today     TREATMENT MINUTES COMMENTS   Evaluation     Self-care/ Home management     Manual therapy 40 Induction, indirect, direct techniques utilized as appropriate for optimal tissue release.   MFR/SCS - pelvic diaphragm, cecum, colon motility, iliocecal valve, duodenum, DJ jct, sigmoid, rectum,     Neuromuscular Re-education     Therapeutic Activity     Therapeutic Exercises 15 " Discussed kegel exercises for urinary incontinence. Instructed in quick 6 urge suppression strategies. Reviewed voiding schedule concepts, goal of 2 hour intervals.    Gait training     Modality__________________                Total 55    Blank areas are intentional and mean the treatment did not include these items.       Lennie Pressley  12/14/2018

## 2021-06-22 NOTE — PROGRESS NOTES
[facility] Daily Progress     Patient Name: Lisa Burr  Date: 2018  Visit #3 (MC cert to 19)  Referral Diagnosis: Abdominal pain, epigastric  Referring provider: Tyler Vazquez*  Visit Diagnosis:     ICD-10-CM    1. Chronic Pain G89.29    2. Pelvic Pain N94.9    3. Bilateral sciatica M54.31     M54.32    4. Lumbar Spondylosis M47.817    5. Bipolar Disorder F30.9    6. Generalized anxiety disorder F41.1    7. History of pelvic surgery Z98.890          Assessment:     Patient is appropriate to continue with skilled physical therapy intervention, as indicated by initial plan of care.    Goal Status:  Pt. will demonstrate/verbalize independence in self-management of condition in : 12 weeks  Pt. will be independent with home exercise program in : 12 weeks  Pt. will have improved quality of sleep: waking less times/night;in 12 weeks;Comment  Comment:: waking 2-3x /night  Pt. will be able to walk : 20 minutes;around the house;with less pain;with less difficulty;for household mobility;for community mobility;in 12 weeks  Patient will stand : 30 minutes;with less pain;with less difficultty;for home chores;in 12 weeks    No Data Recorded    Plan / Patient Education:     Continue with initial plan of care.  Progress with home program as tolerated.   Follow up with pain clinic next week.   Discuss pain neuroscience concepts next visit.     Subjective:     Pain Ratin-8  Increased pain for 2 days after the last session. Still having pain in (R) LB to (R) leg/foot. Continues to have urinary leakage. Sometimes she waits too long to void, resulting in leakage. She has leakage at other times as well for no apparent reason.     Objective:     Cranial scan is (+) for (R) lower sympathetics, parasympathetics.    12 sec/cycle.   GL (+) for cranium.   Tender pts (R) dorsal foot, (R) lumbar paraspinals,     Treatment Today     TREATMENT MINUTES COMMENTS   Evaluation     Self-care/ Home management  "5 nc Issued \"why do I hurt?\" handout. Brief discussion of pain neuroscience concepts.    Manual therapy 53 Induction, indirect, direct techniques utilized as appropriate for optimal tissue release.   MFR/SCS - thoracic inlet, (R) ICUNE (P), (R) LCUNI (P), asc colon, supine LS traction, dural tube pull, (R) LFL3-5-MS,    Neuromuscular Re-education     Therapeutic Activity     Therapeutic Exercises     Gait training     Modality__________________                Total 58    Blank areas are intentional and mean the treatment did not include these items.       Lennie Pressley  12/4/2018    "

## 2021-06-23 NOTE — PATIENT INSTRUCTIONS - HE
PLAN:    At check-out schedule with Dr. Curry for abdominal wall trigger point injections    You will call your Urogynecologist to reschedule pelvic floor physical therapy    You will call Dr. Vazquez with dosage of Lamictal    Dr. Vazquez to take over prescribing psychotropic meds    Magnesium Oxide capsules titrate to bowel tolerance 400 mg 3-4 times a day, sent as prescription    Relistor 150 mg tab one daily and Movantik 25 mg one daily, you will see which your insurance covers better    May refill Fentanyl with 37.5 mcg patch when due    Return in 8 weeks

## 2021-06-23 NOTE — TELEPHONE ENCOUNTER
Called pt to clarify med requests. States she was using Fentanyl 12 mcg and 25 mcg patch but with her new insurance the 12 mcg patch is very costly. Is asking for Fentanyl 37.5 mcg patch next time. Movantik and Relistor are covered by insurance but are too expensive so she hasn't picked them up.

## 2021-06-23 NOTE — PROGRESS NOTES
Assessment/Plan:     Problem List Items Addressed This Visit     Opioid Dependence With Continuous Use    Relevant Medications    magnesium oxide (MAGOX) 400 mg (241.3 mg magnesium) tablet    methylnaltrexone (RELISTOR) 150 mg Tab    naloxegol (MOVANTIK) 25 mg Tab tablet      Other Visit Diagnoses     Chronic pain syndrome    -  Primary    Pain        Relevant Medications    HYDROmorphone (DILAUDID) 4 MG tablet    morphine (MSIR) 15 MG tablet    Other Relevant Orders    OPS Trigger Point Injection Three or More Muscles    Myalgia, other site         Relevant Orders    OPS Trigger Point Injection Three or More Muscles            No Follow-up on file.    Patient Instructions   PLAN:    At check-out schedule with Dr. Curry for abdominal wall trigger point injections    You will call your Urogynecologist to reschedule pelvic floor physical therapy    You will call Dr. Vazquez with dosage of Lamictal    Dr. Vazquez to take over prescribing psychotropic meds    Magnesium Oxide capsules titrate to bowel tolerance 400 mg 3-4 times a day, sent as prescription    Relistor 150 mg tab one daily and Movantik 25 mg one daily, you will see which your insurance covers better    May refill Fentanyl with 37.5 mcg patch when due    Return in 8 weeks        Subjective:       58 y.o. female presents for evaluation of abdominal pain.  [place] she reviews going to the [facility] last week.  There is concern about rectal and vaginal bleeding.  It was recommended she have a colonoscopy and hysteroscopy and her primary care's recommending that.    Abdominal wall trigger points were recommended.  Also pelvic floor physical therapy.  She has called uro-gynecologist to get a referral for the latter, appointment was missed due to the bad weather.    Constipation continues to be an issue.  Metamucil did not help.  She did not try the MiraLAX.  Has tried mag citrate by the bottle without benefit.  Her insurance did not cover in a way she could  afford the Relistor or Movantik.    She reviews wishing to see if I could become her psychiatric provider.  She saw the previous provider every 3 months, describes some interactions that were concerning.    She has been taking Wellbutrin 4-50 mg daily, Seroquel 75 mg bedtime, trazodone 300 mg at bedtime, and the Lamictal dosage uncertain.    She has been using fentanyl 25 and 12 mcg patches.  It is not cause trouble breathing.  Higher doses were concerns we have used morphine, 15 mg 3 times a day, and as that had been not sufficient hydromorphone which she has now used 2 mg tablets up to 5 times a day.  She reviews the morphine by itself did not seem to do well but with the hydromorphone seems to do well, covers enough pain.  The hydromorphone alone seem to make her more tired but in combination does not.    She reviews depression is more matter some of her losses.  She declines working with therapy noting she has done a lot of work and recovery from eating disorder, alcohol use, and some family dynamics.  Acknowledges still some difficult relationships.      Current Outpatient Medications:      naloxone (NARCAN) 4 mg/actuation nasal spray, 1 spray (4 mg dose) into one nostril for opioid reversal. Call 911. May repeat if no response in 3 minutes., Disp: 1 Box, Rfl: 0     venlafaxine (EFFEXOR-XR) 150 MG 24 hr capsule, TAKE 2 CAPSULES BY MOUTH EVERY DAY, Disp: 60 capsule, Rfl: 0     buPROPion (WELLBUTRIN XL) 150 MG 24 hr tablet, Take 450 mg by mouth daily., Disp: , Rfl:      clindamycin (CLEOCIN) 300 MG capsule, One and a half tabs (450 mg) by mouth 3 times a day for 5 days, Disp: 23 capsule, Rfl: 0     docusate sodium (COLACE) 100 MG capsule, Take 1 capsule (100 mg total) by mouth 2 (two) times a day., Disp: 60 capsule, Rfl: 2     fentaNYL (DURAGESIC) 12 mcg/hr, Place 1 patch on the skin every third day for 10 days., Disp: 10 patch, Rfl: 0     fentaNYL (DURAGESIC) 25 mcg/hr, Place 1 patch on the skin every third day  "for 10 days., Disp: 30 patch, Rfl: 0     HYDROmorphone (DILAUDID) 4 MG tablet, Take 1 tablet (4 mg total) by mouth 5 (five) times a day for 14 days., Disp: 70 tablet, Rfl: 0     lamoTRIgine (LAMICTAL) 200 MG tablet, Take 200 mg by mouth., Disp: , Rfl:      magnesium oxide (MAGOX) 400 mg (241.3 mg magnesium) tablet, One tab up to four times a day, Disp: 90 tablet, Rfl: 2     medroxyPROGESTERone (PROVERA) 10 MG tablet, Take 1 tablet by mouth daily. Once daily for 12 days out of the month, Disp: , Rfl: 0     methylnaltrexone (RELISTOR) 150 mg Tab, Take 450 mg by mouth daily., Disp: 30 tablet, Rfl: 2     morphine (MSIR) 15 MG tablet, Take 1 tablet (15 mg total) by mouth every 4 (four) hours as needed for pain., Disp: 84 tablet, Rfl: 0     naloxegol (MOVANTIK) 25 mg Tab tablet, Take 1 tablet (25 mg total) by mouth daily., Disp: 30 tablet, Rfl: 0     QUEtiapine (SEROQUEL) 25 MG tablet, Take 25 mg by mouth at bedtime. Can take up to 3 a night, Disp: , Rfl:      traZODone (DESYREL) 300 MG tablet, TK 1 T PO QD HS, Disp: , Rfl: 2           Objective:     Vitals:    02/04/19 1339   BP: 120/62   Pulse: (!) 102   Weight: 120 lb (54.4 kg)   Height: 5' 6\" (1.676 m)   PainSc:   8   PainLoc: Back       She is alert with a clear sensorium good eye contact.  Thought process tight logical.  Movement disorder noted as a baseline.    Reviewed the approach abdominal wall trigger point injections and she will schedule Dr. Curry at checkout.   discussed bowel regimen as above and the plan.     was reviewed as expected.  We will continue with above.  There is hope to simplify her opioid regimen in the future though this is been added to sequentially with relatively better control as she is considering possible surgical interventions though now that is on hold.    Time spent more than 25 minutes face-to-face 50% count about above condition coronation treatment plan          This note has been dictated using voice recognition " software. Any grammatical or context distortions are unintentional and inherent to the software

## 2021-06-23 NOTE — PROGRESS NOTES
Pt is being seen today by Tyler Vazquez MD, for refills and f/u of 8-constant, shooting, very achy, burning back, lt foot, rt shoulder and stomach pain. F4

## 2021-06-23 NOTE — TELEPHONE ENCOUNTER
Patient calls with an update about medications:  Reports taking: venlafaxine  mg daily  buproprion  mg two tabs daily  Lamotrigine 200 mg daily  Quetiapine 25 mg up to 3 tabs  at HS  Trazodone 300 mg HS    She reports that fentanyl 37.5 mcg patches would work out better than 12 mcg.    There was discussion at last office visit about refilling the 37.5 mcg fentanyl when due for refill.  Will que this for provider

## 2021-06-23 NOTE — TELEPHONE ENCOUNTER
"Referral Request  Type of referral: Gastroenterology / Colonoscopy  Who s requesting: Patient  Why the request: Patient was recommended by [facility] to get a colonoscopy and needs the order/referral. Patient did not get the colonoscopy in 2015 as she \"chickened out\" and patient is now required to get it due to some other issues with vaginal bleeding she is seeing [facility] for.  They stated referral needs to come from the PCP.  Have you been seen for this request: No:  Appointment Offered:  declined  Does patient have a preference on a group/provider? [facility] - [address] [facility] [place]  Phone: [phone]  Fax: [phone]  Okay to leave a detailed message?  Yes    "

## 2021-06-24 NOTE — TELEPHONE ENCOUNTER
Contacted patient with medication directions, and Dr Vazquez request for her to see pharmacist. Patient said she did not want to meet with the pharmacist and requesting that Dr Erickson call her.    Routed to Dr Vazquez

## 2021-06-24 NOTE — TELEPHONE ENCOUNTER
Discussed refill issues with Dr Vazquez, will refill Hydromorphone, will wait until Fentanyl due date. Dr Vazquez would like Lisa to see Duglas Gruber Pharm D  to review medications.    Routed to Dr Vazquez for refill, and scheduling to contact patient to set up appointment with pharmacist.

## 2021-06-24 NOTE — TELEPHONE ENCOUNTER
It was previously recommended that patient follow up with [facility] as there have been multiple concerns about medications.  Patient having cognitive concerns, cutting fentanyl patches and not fully understanding the plan of care.  It does not appear that patient followed up as recommended.  Will send information to provider as well as [facility] pharmacist.

## 2021-06-24 NOTE — TELEPHONE ENCOUNTER
Patient calls requesting refill of fentanyl 37.5 mcg  Per office visit notes on 2/4 with Dr. Vazquez  May refill Fentanyl with 37.5 mcg patch when due    Will cue as fentanyl 37.mcg patch order

## 2021-06-24 NOTE — TELEPHONE ENCOUNTER
Apparently 37.5 mcg patches are more expensive.  Pharmacy now requesting to change back to 12 and 25 mcg patches.  Will re-cue for: Dr. Vazquez

## 2021-06-24 NOTE — TELEPHONE ENCOUNTER
"Medication refill request: Fentanyl and Dilaudid    LV  2/4    [place]  4/2    02/04/2019 1 02/04/2019 Hydromorphone 4 Mg Tablet 70 14 Br [facility]thi [id number] [facility] 0 80.00 [facility] [place]    2/13/2019 1 01/03/2019 Fentanyl 12 Mcg/hr Patch 10 30 Br Keerthi [id number] [facility] 0 28.80 [place]    12/10/2018 1 12/10/2018 Fentanyl 25 Mcg/hr Patch 30 90 Br Keerthi [id number] [facility] 0 60.00 [place] [place]  Patient has not been using Fentanyl patches as directed and is now out of her 25 mcg patches. She states  \"she has been cutting them in half, knows now that she is not suppose to do that\". Current rx should have lasted until 3/10.    Routed to Dr Vazquez  "

## 2021-06-24 NOTE — TELEPHONE ENCOUNTER
Call from pt requesting that Hydromorphone be e-scribed to [facility] in [place].   Called [facility] on [address] to cxl script ordered for 03/10 and confirmed ok to send script across state lines.  Queued and routed to Dr Vazquez for [place], [place].

## 2021-06-24 NOTE — TELEPHONE ENCOUNTER
"Patient calls today to report that she went to her pharmacy and picked up fentanyl 25 mcg patches.  \"I dont know why they had this RX because I know when I talked to you I was not supposed to get this or take this\"  \"But anyways I picked it up\"  Appears from previous documentation that patient discussed in detail that she would not need a refill of this until approximately 3/10.  Also discussion about no longer filling fentanyl but continuing with other pain medications.  Voicemail today seems as thought patient has no recollection of being told this.    There is some concern related to cognition discussed with triage nursing staff.        "

## 2021-06-25 NOTE — TELEPHONE ENCOUNTER
Medication requested:  Morphine and Fentanyl    New prescriptions have been sent to [place], spoke with patient. No further action needed.

## 2021-06-25 NOTE — TELEPHONE ENCOUNTER
Approved refill MSIR 15 mg three times a day prn bridge until visit on 04/02/19.  Note that since this is an out of state prescription, and I am not licensed in the state of [place] have no ability to control how the pharmacist chooses to handle an out of state refill.  Appears she was successful in filling hydromorphone that Dr. Vazquez sent to [place].

## 2021-06-25 NOTE — TELEPHONE ENCOUNTER
Patient calls, voicemail regarding difficulty getting through to medical records to have her records sent to the new pain clinic in [place].  Call placed to patient:  Provided the phone number for medical records, patient will try again, notified patient that if this continues to be a barrier to her moving on to a new clinic to call as she typically would when she is coming due for a refill and we will address that at that time if she still has not established chronic pain care in [place].  Patient agrees to this plan and will call back with any further issues.  Patient may call back so please leave message in the pool for now.

## 2021-06-26 ENCOUNTER — HEALTH MAINTENANCE LETTER (OUTPATIENT)
Age: 61
End: 2021-06-26

## 2021-06-28 ENCOUNTER — RECORDS - HEALTHEAST (OUTPATIENT)
Dept: PALLIATIVE MEDICINE | Facility: OTHER | Age: 61
End: 2021-06-28

## 2021-06-28 DIAGNOSIS — M54.6 PAIN IN THORACIC SPINE: ICD-10-CM

## 2021-06-28 DIAGNOSIS — G89.29 OTHER CHRONIC PAIN: ICD-10-CM

## 2021-06-28 RX ORDER — FENTANYL 25 UG/1
1 PATCH TRANSDERMAL
Qty: 10 PATCH | Refills: 0 | Status: SHIPPED | OUTPATIENT
Start: 2021-06-30 | End: 2021-07-30

## 2021-06-28 RX ORDER — MORPHINE SULFATE 15 MG/1
15 TABLET ORAL EVERY 6 HOURS PRN
Qty: 56 TABLET | Refills: 0 | Status: SHIPPED | OUTPATIENT
Start: 2021-06-29 | End: 2021-07-13

## 2021-06-29 NOTE — PROGRESS NOTES
"Progress Notes by Dorothea Delgado LPN at 5/19/2020  1:00 PM     Author: Dorothea Delgado LPN Service: -- Author Type: Licensed Nurse    Filed: 5/20/2020  5:51 PM Date of Service: 5/19/2020  1:00 PM Status: Signed    : Dorothea Delgado LPN (Licensed Nurse)       Lisa Burr is a 59 y.o. female who is being evaluated via a billable telephone visit.      The patient has been notified of following:     \"This telephone visit will be conducted via a call between you and your physician/provider. We have found that certain health care needs can be provided without the need for a physical exam.  This service lets us provide the care you need with a short phone conversation.  If a prescription is necessary we can send it directly to your pharmacy.  If lab work is needed we can place an order for that and you can then stop by our lab to have the test done at a later time.    Telephone visits are billed at different rates depending on your insurance coverage. During this emergency period, for some insurers they may be billed the same as an in-person visit.  Please reach out to your insurance provider with any questions.    If during the course of the call the physician/provider feels a telephone visit is not appropriate, you will not be charged for this service.\"    Patient has given verbal consent to a Telephone visit? Yes    What phone number would you like to be contacted at? [phone]    Patient would like to receive their AVS by AVS Preference: Anselmo.    Patient is here for a follow up appointment with Dr. Vazquez. Patient has low back, neck and left foot pain, describes the pain as constant, sharp and aching,rates the pain as 5/10. Patient needs refills for hydromorphone. CSA, JOHN, NDI, UDT are deferred due to COVID 19. Functionality is 8. Patient states her pain interferes with her sleep, walking, work, ADL's, and social/relationships.         Dorothea Delgado LPN         "

## 2021-06-29 NOTE — PROGRESS NOTES
"Progress Notes by Dorothea Delgado LPN at 7/15/2020  1:20 PM     Author: Dorothea Delgado LPN Service: -- Author Type: Licensed Nurse    Filed: 7/16/2020  9:48 AM Date of Service: 7/15/2020  1:20 PM Status: Signed    : Dorothea Delgado LPN (Licensed Nurse)       Lisa Burr is a 59 y.o. female who is being evaluated via a billable video visit.      The patient has been notified of following:     \"This video visit will be conducted via a call between you and your physician/provider. We have found that certain health care needs can be provided without the need for an in-person physical exam.  This service lets us provide the care you need with a video conversation.  If a prescription is necessary we can send it directly to your pharmacy.  If lab work is needed we can place an order for that and you can then stop by our lab to have the test done at a later time.    Video visits are billed at different rates depending on your insurance coverage. Please reach out to your insurance provider with any questions.    If during the course of the call the physician/provider feels a video visit is not appropriate, you will not be charged for this service.\"    Patient has given verbal consent to a Video visit? Yes  How would you like to obtain your AVS? AVS Preference: Anselmo.  Patient would like the video invitation sent by: Text to cell phone: [phone]  Will anyone else be joining your video visit? No    Patient is here for a follow up appointment with Dr. Vazquez. Patient has low back, left foot, right shoulder, and neck pain, describes the pain as constant, achy, throbbing, and shooting,rates the pain as 8/10. Patient needs refills for Fentanyl and hydromorphone. CSA, JOHN, NDI and UDT are deferred due to COVID 19. Functionality is 8. Patient states their pain interferes with sleep, walking, work, ADL's and relationships.          Dorothea Delgado LPN       "

## 2021-07-03 NOTE — ADDENDUM NOTE
Addendum Note by Melida Dawkins RN at 8/27/2020  2:12 PM     Author: Melida Dawkins RN Service: -- Author Type: Registered Nurse    Filed: 8/27/2020  2:12 PM Encounter Date: 8/10/2020 Status: Signed    : Melida Dawkins RN (Registered Nurse)    Addended by: MELIDA DAWKINS on: 8/27/2020 02:12 PM        Modules accepted: Orders

## 2021-07-03 NOTE — ADDENDUM NOTE
Addendum Note by Jose Hopper MD at 4/23/2020  3:50 PM     Author: Jose Hopper MD Service: -- Author Type: Physician    Filed: 4/23/2020  3:50 PM Encounter Date: 4/1/2020 Status: Signed    : Jose Hopper MD (Physician)    Addended by: JOSE HOPPER on: 4/23/2020 03:50 PM        Modules accepted: Orders

## 2021-07-03 NOTE — ADDENDUM NOTE
Addendum Note by Melida Dawkins RN at 4/23/2020  9:58 AM     Author: Melida Dawkins RN Service: -- Author Type: Registered Nurse    Filed: 4/23/2020  9:58 AM Encounter Date: 4/1/2020 Status: Signed    : Melida Dawkins RN (Registered Nurse)    Addended by: MELIDA DAWKINS on: 4/23/2020 09:58 AM        Modules accepted: Orders

## 2021-07-03 NOTE — ADDENDUM NOTE
Addendum Note by Jose Hopper MD at 12/17/2018  4:49 PM     Author: Jose Hopper MD Service: -- Author Type: Physician    Filed: 12/17/2018  4:49 PM Encounter Date: 12/14/2018 Status: Signed    : Jose Hopper MD (Physician)    Addended by: JOSE HOPPER on: 12/17/2018 04:49 PM        Modules accepted: Orders

## 2021-07-03 NOTE — ADDENDUM NOTE
Addendum Note by Jose Hopper MD at 11/2/2017 10:06 AM     Author: Jose Hopper MD Service: -- Author Type: Physician    Filed: 11/2/2017 10:06 AM Encounter Date: 11/1/2017 Status: Signed    : Jose Hopper MD (Physician)    Addended by: JOSE HOPPER on: 11/2/2017 10:06 AM        Modules accepted: Orders

## 2021-07-03 NOTE — ADDENDUM NOTE
Addendum Note by Tyler Vazquez MD at 1/12/2018 11:59 PM     Author: Tyler Vazquez MD Service: -- Author Type: Physician    Filed: 1/13/2018  6:43 PM Date of Service: 1/12/2018 11:59 PM Status: Signed    : Tyler Vazquez MD (Physician)    Encounter addended by: Tyler Vazquez MD on: 1/13/2018  6:43 PM<BR>     Actions taken: Sign clinical note

## 2021-07-03 NOTE — ADDENDUM NOTE
Addendum Note by Dalila Lawrence RN at 2/25/2020  3:40 PM     Author: Dalila Lawrence RN Service: -- Author Type: Registered Nurse    Filed: 2/25/2020  3:40 PM Encounter Date: 2/4/2020 Status: Signed    : Dalila Lawrence RN (Registered Nurse)    Addended by: DALILA LAWRENCE on: 2/25/2020 03:40 PM        Modules accepted: Orders

## 2021-07-03 NOTE — ADDENDUM NOTE
Addendum Note by Tyler Vazquez MD at 1/12/2018 11:59 PM     Author: Tyler Vazquez MD Service: -- Author Type: Physician    Filed: 1/13/2018  6:49 PM Date of Service: 1/12/2018 11:59 PM Status: Signed    : Tyler Vazquez MD (Physician)    Encounter addended by: Tyler Vazquez MD on: 1/13/2018  6:49 PM<BR>     Actions taken: Sign clinical note

## 2021-07-03 NOTE — ADDENDUM NOTE
Addendum Note by Althea oHffman, RN at 12/17/2018  4:31 PM     Author: Althea Hoffman RN Service: -- Author Type: Registered Nurse    Filed: 12/17/2018  4:31 PM Encounter Date: 12/14/2018 Status: Signed    : Althea Hoffman RN (Registered Nurse)    Addended by: ALTHEA HOFFMAN on: 12/17/2018 04:31 PM        Modules accepted: Orders

## 2021-07-03 NOTE — ADDENDUM NOTE
Addendum Note by Jose Hopper MD at 11/4/2020  5:21 PM     Author: Jose Hopper MD Service: -- Author Type: Physician    Filed: 11/4/2020  5:21 PM Encounter Date: 10/26/2020 Status: Signed    : Jose Hopper MD (Physician)    Addended by: JOSE HOPPER on: 11/4/2020 05:21 PM        Modules accepted: Orders

## 2021-07-03 NOTE — ADDENDUM NOTE
Addendum Note by Jose Hopper MD at 3/10/2019  5:55 PM     Author: Jose Hopper MD Service: -- Author Type: Physician    Filed: 3/10/2019  5:55 PM Encounter Date: 3/8/2019 Status: Signed    : Jose Hopper MD (Physician)    Addended by: JOSE HOPPER on: 3/10/2019 05:55 PM        Modules accepted: Orders

## 2021-07-03 NOTE — ADDENDUM NOTE
Addendum Note by Danielle Mackay at 5/19/2020  1:00 PM     Author: Danielle Mackay Service: -- Author Type: --    Filed: 5/21/2020 10:19 AM Date of Service: 5/19/2020  1:00 PM Status: Signed    : Danielle Mackay    Encounter addended by: Danielle Mackay on: 5/21/2020 10:19 AM      Actions taken: Charge Capture section accepted

## 2021-07-03 NOTE — ADDENDUM NOTE
Addendum Note by Melida Dawkins RN at 11/2/2017  9:09 AM     Author: Melida Dawkins RN Service: -- Author Type: Registered Nurse    Filed: 11/2/2017  9:09 AM Encounter Date: 11/1/2017 Status: Signed    : Melida Dawkins RN (Registered Nurse)    Addended by: MELIDA DAWKINS on: 11/2/2017 09:09 AM        Modules accepted: Orders

## 2021-07-03 NOTE — ADDENDUM NOTE
Addendum Note by Jose Hopper MD at 4/19/2018  4:50 PM     Author: Jose Hopper MD Service: -- Author Type: Physician    Filed: 4/19/2018  4:50 PM Encounter Date: 4/11/2018 Status: Signed    : Jose Hopper MD (Physician)    Addended by: JOSE HOPPER on: 4/19/2018 04:50 PM        Modules accepted: Orders

## 2021-07-03 NOTE — ADDENDUM NOTE
Addendum Note by Jose Hopper MD at 1/19/2021  4:38 PM     Author: Jose Hopper MD Service: -- Author Type: Physician    Filed: 1/19/2021  4:38 PM Encounter Date: 1/12/2021 Status: Signed    : Jose Hopper MD (Physician)    Addended by: JOSE HOPPER on: 1/19/2021 04:38 PM        Modules accepted: Orders

## 2021-07-03 NOTE — ADDENDUM NOTE
Addendum Note by Danielle Mackay at 7/15/2020  1:20 PM     Author: Danielle Mackay Service: -- Author Type: --    Filed: 7/20/2020 11:07 AM Date of Service: 7/15/2020  1:20 PM Status: Signed    : Danielle Mackay    Encounter addended by: Danielle Mackay on: 7/20/2020 11:07 AM      Actions taken: Charge Capture section accepted

## 2021-07-03 NOTE — ADDENDUM NOTE
Addendum Note by Jose Rodarte DO at 6/7/2017  1:18 PM     Author: Jose Rodarte DO Service: -- Author Type: Physician    Filed: 6/7/2017  1:18 PM Encounter Date: 6/7/2017 Status: Signed    : Jose Rodarte DO (Physician)    Addended by: JOSE RODARTE on: 6/7/2017 01:18 PM        Modules accepted: Orders

## 2021-07-03 NOTE — ADDENDUM NOTE
Addendum Note by Danielle Mackay at 9/16/2020  1:00 PM     Author: Danielle Mackay Service: -- Author Type: --    Filed: 9/21/2020  9:25 AM Date of Service: 9/16/2020  1:00 PM Status: Signed    : Danielle Mackay    Encounter addended by: Danielle Mackay on: 9/21/2020  9:25 AM      Actions taken: Charge Capture section accepted

## 2021-07-07 NOTE — TELEPHONE ENCOUNTER
"Spoke with patient and as noted she [facility] in [place]. She is requesting refills on her Fentanyl and Morphine to get her past that appointment. She states she is taking care of her elderly parents and is doing a lot of \"heavy work\".  Last visit 5/26  BE    Morphine last filled 5/31, # 20 tablets, 5 day supply.   Fentanyl 25 mcg/hr patches last filled 5/30    Requested Prescriptions     Pending Prescriptions Disp Refills     fentaNYL (DURAGESIC) 25 mcg/hr 10 patch 0     Sig: Place 1 patch on the skin every third day.     morphine (MSIR) 15 MG tablet 56 tablet 0     Sig: Take 1 tablet (15 mg total) by mouth every 6 (six) hours as needed for pain.     Pharmacy--[facility]    "

## 2021-07-07 NOTE — TELEPHONE ENCOUNTER
Pt calling for refill of Fentanyl Patches and Morphine.  She has been unable to get established with a new pain center in [place] due to not being able to get our medical records sent in time.  She has appt now scheduled for 07-12-21.

## 2021-07-22 ENCOUNTER — RECORDS - HEALTHEAST (OUTPATIENT)
Dept: MAMMOGRAPHY | Facility: CLINIC | Age: 61
End: 2021-07-22

## 2021-07-22 DIAGNOSIS — Z12.31 OTHER SCREENING MAMMOGRAM: ICD-10-CM

## 2021-10-16 ENCOUNTER — HEALTH MAINTENANCE LETTER (OUTPATIENT)
Age: 61
End: 2021-10-16

## 2022-07-23 ENCOUNTER — HEALTH MAINTENANCE LETTER (OUTPATIENT)
Age: 62
End: 2022-07-23

## 2022-10-01 ENCOUNTER — HEALTH MAINTENANCE LETTER (OUTPATIENT)
Age: 62
End: 2022-10-01

## 2023-08-06 ENCOUNTER — HEALTH MAINTENANCE LETTER (OUTPATIENT)
Age: 63
End: 2023-08-06

## 2023-09-18 NOTE — TELEPHONE ENCOUNTER
.      
Patient left message on triage line requesting refill on Hydromorphone. Refill due on 10/16 left message for patient to call clinic to let us know if she would like her prescription mailed to her pharmacy or if she would like to pick it up..  
Pt called back stating she didn't understand the message left regarding her script. LMTCB regarding whether to mail or p/u.  
Weakness

## 2024-02-22 NOTE — PROGRESS NOTES
Assessment/Plan:     Problem List Items Addressed This Visit     Midback Pain    Relevant Medications    fentaNYL (DURAGESIC) 25 mcg/hr (Start on 5/27/2021)    Chronic Pain    Relevant Medications    morphine (MSIR) 15 MG tablet              No follow-ups on file.    There are no Patient Instructions on file for this visit.      Subjective:       60 y.o. female scheduled for virtual visit.  Staff realize patient was in [place], so canceled scheduled visit.  Graph I did discuss with patient.  She has gone to [place] to be with her parents who are both ill.  She has relocated, depending upon that health may be there for up to a year.    I reviewed that we cannot have ongoing appointments out of state.  She was last seen on 3/3, I will fill these prescriptions that are due.  She has been using the fentanyl 25 mcg patch every 3 days.  She has been using the morphine 15 mg immediate release, and take from 0-6 in a day depending upon activities such as as she has been moving.  No side effects.    No changes in her medical condition.    She will look into establishing with other providers while in [place], and I will clarify our administrations approach for patients out of state.     reviewed.  Did receive the prescriptions on 4/26 to her pharmacy in [place]         Objective:   There were no vitals filed for this visit.                This note has been dictated using voice recognition software. Any grammatical or context distortions are unintentional and inherent to the software  
Patient is in [place], this appointment was cancelled.   Dorothea Delgado LPN  
This was a shared visit with the [facility]. I reviewed and verified the documentation.

## 2024-11-06 NOTE — PROGRESS NOTES
Detail Level: Detailed [facility] Note    Lisa Burr   56 y.o. female    Date of Visit: 7/24/2017  Chief Complaint   Patient presents with     Pre-op Exam     Vaginal mesh repair at [facility] with Dr. Palma on 7/21.       ASSESSMENT/PLAN  1. Pre-operative examination for internal medicine  Electrocardiogram Perform and Read    HM2(CBC w/o Differential)    Urinalysis-UC if Indicated   2. Colon cancer screening  Cologuard   3. Visit for screening mammogram  Mammo Screening Bilateral     ---------------------------------------------    1.  Medical record risks optimize, recommended hemogram and urinalysis based on the nature of the surgery. RCRI score is 0.  Recommend proceeding to OR without additional evaluation.  Advised to abstain from aspirin or NSAIDs prior to surgery.  Okay to take her prescription medications as scheduled.   --addendum- start cipro due to pyuria pending Cx    2. Cologuard    3. Mammogram        Return for Annual physical.      SUBJECTIVE  Lisa Burr is a 56-year-old woman who presents for preop assessment prior to bladder mesh reconstruction surgery.  She has a history of bladder mass, long history of vaginal bleeding, has seen [facility] for this, and had [facility].  She was referred to urogynecology, ended up seeing Dr. Jensen, who has identified source of bleeding as the mesh.  Lsia tells me this is eroding into the vagina causing bleeding, she has also had some rectal leaking and bladder leaking.  Surgery is planned for [facility].    She has no history of heart disease, stroke, diabetes, or chronic kidney disease.  She can walk up to the top of a flight of stairs without difficulty.    We talked about mammography and colon cancer screening.  She would like to pursue [facility]'s Cologuard.    She notes that she is down to 25 MCG/hour for fentanyl patch.    ROS A comprehensive review of systems was performed and was otherwise negative    Medications, allergies, and problem  list were reviewed and updated      Past Medical History:   Diagnosis Date     Anemia      Anxiety      Back pain     low back and neck     Depression      GERD (gastroesophageal reflux disease)     states takes Tums 2-3 times/week     Irritable bowel syndrome (IBS)      Postmenopausal bleeding      Scoliosis      Past Surgical History:   Procedure Laterality Date     BLADDER REPAIR      with mesh     COMBINED HYSTEROSCOPY DIAGNOSTIC / D&C N/A 10/9/2015    Procedure: DILATION AND CURETTAGE WITH HYSTEROSCOPY;  Surgeon: Gato Sepulveda MD;  Location: [facility];  Service:      PA MYOMECTOMY 1-4,W/TOT 250GMS/<,ABD APPRCH      Description: Uterine Myomectomy;  Recorded: 09/20/2011;     PA REMOVE TONSILS/ADENOIDS,<13 Y/O      Description: Tonsillectomy With Adenoidectomy;  Recorded: 09/20/2011;     PA TARSAL TUNNEL RELEASE      Description: Neuroplasty Decompression Tarsal Tunnel Release;  Recorded: 10/29/2011;  Annotations: Left sided     SHOULDER SURGERY Right      Social History     Social History     Marital status:      Spouse name: N/A     Number of children: N/A     Years of education: N/A     Occupational History     Not on file.     Social History Main Topics     Smoking status: Former Smoker     Smokeless tobacco: Not on file      Comment: quit 6 years ago     Alcohol use Not on file     Drug use: Not on file     Sexual activity: Not on file     Other Topics Concern     Not on file     Social History Narrative     Family History   Problem Relation Age of Onset     Breast cancer Maternal Aunt        Current Outpatient Prescriptions   Medication Sig Dispense Refill     buPROPion (WELLBUTRIN XL) 150 MG 24 hr tablet   0     cloNIDine HCl (CATAPRES) 0.1 MG tablet TAKE 1 TABLET BY MOUTH THREE TIMES DAILY AS NEEDED 20 tablet 0     fentaNYL (DURAGESIC) 25 mcg/hr Place 1 patch on the skin every third day. 10 patch 0     lactulose 10 gram/15 mL solution TAKE 30 ML BY MOUTH THREE TIMES DAILY 240 mL 4  "    lamoTRIgine (LAMICTAL) 200 MG tablet Take 200 mg by mouth.       QUEtiapine (SEROQUEL) 25 MG tablet Take 25 mg by mouth at bedtime. Can take up to 3 a night       tiZANidine (ZANAFLEX) 4 MG tablet One bedtime and may repeat after four hours 60 tablet 1     traZODone (DESYREL) 300 MG tablet TK 1 T PO QD HS  2     venlafaxine (EFFEXOR-XR) 150 MG 24 hr capsule Take 300 mg by mouth daily.       No current facility-administered medications for this visit.        Allergies   Allergen Reactions     Penicillins        EXAM  Vitals:    07/24/17 1633   BP: 102/58   Patient Site: Left Arm   Patient Position: Sitting   Cuff Size: Adult Regular   Pulse: 70   Resp: 16   Weight: 115 lb 5 oz (52.3 kg)   Height: 5' 5\" (1.651 m)     General: Alert, pleasant, no distress  HEENT: Teeth, No partials or dentures  Heart: Regular rate rhythm, no murmurs  Lungs: Clear to auscultation bilaterally  Lower extremities: No peripheral edema  Skin: Warm, dry, no rashes  Neurologic: Alert and oriented ×3, no gross abnormalities  Psychiatric: Pleasant affect    RESULTS REVIEWED:     ANALYSIS AND SUMMARY OF OLD RECORDS, NOTES AND CONSULTS (2): Reviewed my note from 9/10/15 discussing vaginal/menstrual bleeding    RECORDS REQUESTED (1): None.     OTHER HISTORY SUMMARIZED (from nursing staff, family, friends) (2):     RADIOLOGY TESTS SUMMARIZED or REQUESTED (XRAY/CT/MRI/DXA) (1):     MEDICINE TESTS SUMMARIZED or REQUESTED (EKG/ECHO/COLONOSCOPY/EGD) (1): EKG performed today    INDEPENDENT REVIEW OF EKG OR X-RAY (2): EKG shows normal sinus rhythm, normal axis, no pathologic Q waves, no ST or T-wave abnormalities.    Labs done today    Recent Results (from the past 240 hour(s))   Electrocardiogram Perform and Read   Result Value Ref Range    SYSTOLIC BLOOD PRESSURE  mmHg    DIASTOLIC BLOOD PRESSURE  mmHg    VENTRICULAR RATE 79 BPM    ATRIAL RATE 79 BPM    P-R INTERVAL 186 ms    QRS DURATION 88 ms    Q-T INTERVAL 396 ms    QTC CALCULATION (BEZET) 454 " ms    P Axis 81 degrees    R AXIS 84 degrees    T AXIS 75 degrees    MUSE DIAGNOSIS       Normal sinus rhythm  Normal ECG  No previous ECGs available     HM2(CBC w/o Differential)   Result Value Ref Range    WBC 8.2 4.0 - 11.0 thou/uL    RBC 4.10 3.80 - 5.40 mill/uL    Hemoglobin 11.3 (L) 12.0 - 16.0 g/dL    Hematocrit 33.5 (L) 35.0 - 47.0 %    MCV 82 80 - 100 fL    MCH 27.6 27.0 - 34.0 pg    MCHC 33.9 32.0 - 36.0 g/dL    RDW 12.2 11.0 - 14.5 %    Platelets 258 140 - 440 thou/uL    MPV 6.4 (L) 7.0 - 10.0 fL   Urinalysis-UC if Indicated   Result Value Ref Range    Color, UA Yellow Colorless, Yellow, Straw, Light Yellow    Clarity, UA Clear Clear    Glucose, UA Negative Negative    Bilirubin, UA Negative Negative    Ketones, UA Negative Negative    Specific Gravity, UA 1.010 1.005 - 1.030    Blood, UA Trace (!) Negative    pH, UA 6.5 5.0 - 8.0    Protein, UA Negative Negative mg/dL    Urobilinogen, UA 1.0 E.U./dL 0.2 E.U./dL, 1.0 E.U./dL    Nitrite, UA Negative Negative    Leukocytes, UA Moderate (!) Negative    Bacteria, UA Few (!) None Seen hpf    RBC, UA 0-2 None Seen, 0-2 hpf    WBC, UA 5-10 (!) None Seen, 0-5 hpf        Data points  6     Tyler Rodarte DO  Internal Medicine  [facility]